# Patient Record
Sex: FEMALE | Race: WHITE | NOT HISPANIC OR LATINO | Employment: FULL TIME | URBAN - METROPOLITAN AREA
[De-identification: names, ages, dates, MRNs, and addresses within clinical notes are randomized per-mention and may not be internally consistent; named-entity substitution may affect disease eponyms.]

---

## 2020-08-14 ENCOUNTER — TELEPHONE (OUTPATIENT)
Dept: FAMILY MEDICINE | Facility: CLINIC | Age: 45
End: 2020-08-14

## 2020-08-14 ENCOUNTER — OFFICE VISIT (OUTPATIENT)
Dept: FAMILY MEDICINE | Facility: CLINIC | Age: 45
End: 2020-08-14
Payer: COMMERCIAL

## 2020-08-14 VITALS
TEMPERATURE: 98 F | OXYGEN SATURATION: 99 % | WEIGHT: 172.81 LBS | DIASTOLIC BLOOD PRESSURE: 78 MMHG | HEIGHT: 63 IN | HEART RATE: 85 BPM | SYSTOLIC BLOOD PRESSURE: 120 MMHG | BODY MASS INDEX: 30.62 KG/M2

## 2020-08-14 DIAGNOSIS — E61.1 IRON DEFICIENCY: ICD-10-CM

## 2020-08-14 DIAGNOSIS — R53.83 FATIGUE, UNSPECIFIED TYPE: ICD-10-CM

## 2020-08-14 DIAGNOSIS — Z11.59 SCREENING FOR VIRAL DISEASE: ICD-10-CM

## 2020-08-14 DIAGNOSIS — R63.5 WEIGHT GAIN: ICD-10-CM

## 2020-08-14 DIAGNOSIS — R22.1 NECK MASS: Primary | ICD-10-CM

## 2020-08-14 DIAGNOSIS — Z85.71 PERSONAL HISTORY OF HODGKIN LYMPHOMA: ICD-10-CM

## 2020-08-14 DIAGNOSIS — R73.03 PREDIABETES: ICD-10-CM

## 2020-08-14 PROCEDURE — 3008F PR BODY MASS INDEX (BMI) DOCUMENTED: ICD-10-PCS | Mod: CPTII,S$GLB,, | Performed by: NURSE PRACTITIONER

## 2020-08-14 PROCEDURE — 99204 OFFICE O/P NEW MOD 45 MIN: CPT | Mod: S$GLB,,, | Performed by: NURSE PRACTITIONER

## 2020-08-14 PROCEDURE — 99204 PR OFFICE/OUTPT VISIT, NEW, LEVL IV, 45-59 MIN: ICD-10-PCS | Mod: S$GLB,,, | Performed by: NURSE PRACTITIONER

## 2020-08-14 PROCEDURE — 3008F BODY MASS INDEX DOCD: CPT | Mod: CPTII,S$GLB,, | Performed by: NURSE PRACTITIONER

## 2020-08-14 NOTE — PROGRESS NOTES
Subjective:       Patient ID: Alyssa Titus is a 44 y.o. female.    Chief Complaint: Establish Care (lymph node thats swollen) and Fatigue    HPI   Here to est primary care. She lives in [place]. She is a . She is . She has a PMH of diabetes, hodgens lymphoma (diagnosed in 2000) and went through chemo--Dr Melida Huerta medical oncologist at [facility] in [place]. She had 3 back surgeries for herniated discs--Dr Girish Albarado [employer] 2001 & Dr Eaton at [facility] 1996. She was diagnosed w/ Covid-19 July 5. She was treated by her GYN provider w/ nebulized desonide, azithromycin, zinc, tessalon.    She has a swollen node on left side of neck since April--sometimes it gets bigger and then it will smaller in a waxing and waning pattern.      Very fatigued for the last 6 mo, including a 15 lb weight gain.      Review of Systems   Constitutional: Positive for fatigue and unexpected weight change (weight gain). Negative for activity change, appetite change, chills and fever.   HENT: Negative for congestion, hearing loss, nosebleeds, postnasal drip, sinus pressure, sore throat and trouble swallowing.    Respiratory: Negative for cough, chest tightness, shortness of breath and wheezing.    Cardiovascular: Negative for chest pain and palpitations.   Gastrointestinal: Negative for abdominal distention, abdominal pain, constipation, diarrhea, nausea and vomiting.   Musculoskeletal: Negative for arthralgias, back pain, joint swelling, neck pain and neck stiffness.   Skin: Negative for color change and pallor.   Neurological: Negative for dizziness, seizures, syncope, weakness, light-headedness and headaches.   Hematological: Positive for adenopathy. Does not bruise/bleed easily.   Psychiatric/Behavioral: Negative for agitation, behavioral problems, confusion and sleep disturbance. The patient is not nervous/anxious.            Past Medical History:  Past Medical History:   Diagnosis Date     Cancer       Past Surgical History:   Procedure Laterality Date     SECTION      SPINE SURGERY        Review of patient's allergies indicates:   Allergen Reactions    Morphine (pf) Itching      No current outpatient medications on file.     No current facility-administered medications for this visit.      Social History     Socioeconomic History    Marital status:      Spouse name: Not on file    Number of children: Not on file    Years of education: Not on file    Highest education level: Not on file   Occupational History    Not on file   Social Needs    Financial resource strain: Not on file    Food insecurity     Worry: Not on file     Inability: Not on file    Transportation needs     Medical: Not on file     Non-medical: Not on file   Tobacco Use    Smoking status: Never Smoker   Substance and Sexual Activity    Alcohol use: Never     Frequency: Never    Drug use: Never    Sexual activity: Yes     Partners: Male     Birth control/protection: Partner-Vasectomy   Lifestyle    Physical activity     Days per week: Not on file     Minutes per session: Not on file    Stress: Not on file   Relationships    Social connections     Talks on phone: Not on file     Gets together: Not on file     Attends Hoahaoism service: Not on file     Active member of club or organization: Not on file     Attends meetings of clubs or organizations: Not on file     Relationship status: Not on file   Other Topics Concern    Not on file   Social History Narrative    Not on file      Family History   Problem Relation Age of Onset    Diabetes Mother     Diabetes Father     Cancer Maternal Aunt     Cancer Maternal Uncle     Cancer Paternal Aunt         Objective:      Physical Exam  Constitutional:       Appearance: She is well-developed.   HENT:      Head: Normocephalic and atraumatic.   Eyes:      General: No scleral icterus.     Conjunctiva/sclera: Conjunctivae normal.      Pupils: Pupils are  equal, round, and reactive to light.   Neck:      Musculoskeletal: Normal range of motion and neck supple.      Thyroid: No thyroid mass.      Vascular: No carotid bruit.      Trachea: Trachea normal.   Cardiovascular:      Rate and Rhythm: Normal rate and regular rhythm.   Pulmonary:      Effort: Pulmonary effort is normal.   Musculoskeletal: Normal range of motion.   Skin:     General: Skin is warm and dry.   Neurological:      Mental Status: She is alert and oriented to person, place, and time.   Psychiatric:         Behavior: Behavior normal.         Judgment: Judgment normal.         Assessment:       1. Neck mass    2. Fatigue, unspecified type    3. Iron deficiency    4. Prediabetes    5. Weight gain    6. Screening for viral disease    7. Personal history of Hodgkin lymphoma        Plan:       PROBLEM LIST     Alyssa was seen today for establish care and fatigue.    Diagnoses and all orders for this visit:    Neck mass  Comments:  left side    Orders:  -     US Soft Tissue Head Neck Thyroid; Future  -     CBC auto differential; Future  -     Comprehensive metabolic panel; Future  -     Lipid Panel; Future  -     Hemoglobin A1C; Future  -     TSH and Free T4; Future  -     Vitamin B12/folate, serum panel; Future  -     Iron and TIBC; Future  -     CBC auto differential  -     Comprehensive metabolic panel  -     Lipid Panel  -     Hemoglobin A1C  -     TSH and Free T4  -     Vitamin B12/folate, serum panel  -     Iron and TIBC    Fatigue, unspecified type  -     US Soft Tissue Head Neck Thyroid; Future  -     CBC auto differential; Future  -     Comprehensive metabolic panel; Future  -     Lipid Panel; Future  -     Hemoglobin A1C; Future  -     TSH and Free T4; Future  -     Vitamin B12/folate, serum panel; Future  -     Iron and TIBC; Future  -     CBC auto differential  -     Comprehensive metabolic panel  -     Lipid Panel  -     Hemoglobin A1C  -     TSH and Free T4  -     Vitamin B12/folate, serum panel  -      Iron and TIBC    Iron deficiency  -     US Soft Tissue Head Neck Thyroid; Future  -     CBC auto differential; Future  -     Comprehensive metabolic panel; Future  -     Lipid Panel; Future  -     Hemoglobin A1C; Future  -     TSH and Free T4; Future  -     Vitamin B12/folate, serum panel; Future  -     Iron and TIBC; Future  -     CBC auto differential  -     Comprehensive metabolic panel  -     Lipid Panel  -     Hemoglobin A1C  -     TSH and Free T4  -     Vitamin B12/folate, serum panel  -     Iron and TIBC    Prediabetes  -     US Soft Tissue Head Neck Thyroid; Future  -     CBC auto differential; Future  -     Comprehensive metabolic panel; Future  -     Lipid Panel; Future  -     Hemoglobin A1C; Future  -     TSH and Free T4; Future  -     Vitamin B12/folate, serum panel; Future  -     Iron and TIBC; Future  -     CBC auto differential  -     Comprehensive metabolic panel  -     Lipid Panel  -     Hemoglobin A1C  -     TSH and Free T4  -     Vitamin B12/folate, serum panel  -     Iron and TIBC    Weight gain  -     US Soft Tissue Head Neck Thyroid; Future  -     CBC auto differential; Future  -     Comprehensive metabolic panel; Future  -     Lipid Panel; Future  -     Hemoglobin A1C; Future  -     TSH and Free T4; Future  -     Vitamin B12/folate, serum panel; Future  -     Iron and TIBC; Future  -     CBC auto differential  -     Comprehensive metabolic panel  -     Lipid Panel  -     Hemoglobin A1C  -     TSH and Free T4  -     Vitamin B12/folate, serum panel  -     Iron and TIBC    Screening for viral disease  -     HIV 1/2 Ag/Ab (4th Gen); Future  -     HIV 1/2 Ag/Ab (4th Gen)    Personal history of Hodgkin lymphoma  -     US Soft Tissue Head Neck Thyroid; Future  -     CBC auto differential; Future  -     Comprehensive metabolic panel; Future  -     Lipid Panel; Future  -     Hemoglobin A1C; Future  -     TSH and Free T4; Future  -     Vitamin B12/folate, serum panel; Future  -     Iron and TIBC;  Future  -     CBC auto differential  -     Comprehensive metabolic panel  -     Lipid Panel  -     Hemoglobin A1C  -     TSH and Free T4  -     Vitamin B12/folate, serum panel  -     Iron and TIBC      Extreme fatigue symptoms and 15 lb weight gain. Will doing baseline fasting labs including thyroid   panel , cbc, b12/folate.     Also prior hx prediabetes/diabetes. She was able to reverse this w/ diet and exercise. Ordering fasting glucose and a1c due to recent weight gain issues.     Vague nodule left side of neck w/i cervical chain-- I believe it is a superficial lymph node she is feeling, but small. Given her lymphoma hx, will order US of neck. RTC in 3 weeks for f/u and we will review labs and imaging at that time.     Will attempt to get previous health records to update screens, vaccines.     I spent 30 minutes with the patient face-to-face, more than 50% was in counseling about patient's condition

## 2020-08-14 NOTE — TELEPHONE ENCOUNTER
----- Message from Hanna Jesus sent at 8/14/2020 11:41 AM CDT -----  #Patient need to speak to nurse regarding scheduling. Call back number [phone] (JEREMIE Dale

## 2020-08-17 LAB
ABS NRBC COUNT: 0 X 10 3/UL (ref 0–0.01)
ABSOLUTE BASOPHIL: 0.04 X 10 3/UL (ref 0–0.22)
ABSOLUTE EOSINOPHIL: 0.1 X 10 3/UL (ref 0.04–0.54)
ABSOLUTE IMMATURE GRAN: 0.05 X 10 3/UL (ref 0–0.04)
ABSOLUTE LYMPHOCYTE: 2.38 X 10 3/UL (ref 0.86–4.75)
ABSOLUTE MONOCYTE: 0.68 X 10 3/UL (ref 0.22–1.08)
ALBUMIN SERPL-MCNC: 4.4 G/DL (ref 3.5–5.2)
ALBUMIN/GLOB SERPL ELPH: 1.5 {RATIO} (ref 1–2.7)
ALP ISOS SERPL LEV INH-CCNC: 62 U/L (ref 35–105)
ALT (SGPT): 44 U/L (ref 0–33)
ANION GAP SERPL CALC-SCNC: 12 MMOL/L (ref 8–17)
AST SERPL-CCNC: 28 U/L (ref 0–32)
B12: 911 PG/ML (ref 232–1245)
BASOPHILS NFR BLD: 0.4 % (ref 0.2–1.2)
BILIRUBIN, TOTAL: 0.4 MG/DL (ref 0–1.2)
BUN/CREAT SERPL: 18.6 (ref 6–20)
CALCIUM SERPL-MCNC: 9.3 MG/DL (ref 8.6–10.2)
CARBON DIOXIDE, CO2: 26 MMOL/L (ref 22–29)
CHLORIDE: 104 MMOL/L (ref 98–107)
CHOLEST SERPL-MSCNC: 182 MG/DL (ref 100–200)
CREAT SERPL-MCNC: 0.59 MG/DL (ref 0.5–0.9)
EOSINOPHIL NFR BLD: 0.9 % (ref 0.7–7)
ESTIMATED AVERAGE GLUCOSE: 137 MG/DL
FOLATE SERPL-MCNC: 29 NG/ML (ref 4.4–31)
GFR ESTIMATION: 109.73
GLOBULIN: 3 G/DL (ref 1.5–4.5)
GLUCOSE: 115 MG/DL (ref 74–106)
HBA1C MFR BLD: 6.4 % (ref 4–6)
HCT VFR BLD AUTO: 40.4 % (ref 37–47)
HDLC SERPL-MCNC: 71 MG/DL
HGB BLD-MCNC: 12.1 G/DL (ref 12–16)
HIV 1+2 AB+HIV1 P24 AG SERPL QL IA: NONREACTIVE
IMMATURE GRANULOCYTES: 0.5 % (ref 0–0.5)
IRON BINDING CAPACITY: 442 UG/DL (ref 262–472)
IRON SERPL-MCNC: 36 UG/DL (ref 37–145)
LDL/HDL RATIO: 1.2 (ref 1–3)
LDLC SERPL CALC-MCNC: 87.4 MG/DL (ref 0–100)
LYMPHOCYTES NFR BLD: 22.2 % (ref 19.3–53.1)
MCH RBC QN AUTO: 22 PG (ref 27–32)
MCHC RBC AUTO-ENTMCNC: 30 G/DL (ref 32–36)
MCV RBC AUTO: 73.5 FL (ref 82–100)
MONOCYTES NFR BLD: 6.3 % (ref 4.7–12.5)
NEUTROPHILS ABSOLUTE COUNT: 7.46 X 10 3/UL (ref 2.15–7.56)
NEUTROPHILS NFR BLD: 69.7 %
NUCLEATED RED BLOOD CELLS: 0 /100 WBC (ref 0–0.2)
PLATELET # BLD AUTO: 328 X 10 3/UL (ref 135–400)
POTASSIUM: 4.2 MMOL/L (ref 3.5–5.1)
PROT SNV-MCNC: 7.4 G/DL (ref 6.4–8.3)
RBC # BLD AUTO: 5.5 X 10 6/UL (ref 4.2–5.4)
RDW-SD: 47.5 FL (ref 37–54)
SODIUM: 142 MMOL/L (ref 136–145)
T4, FREE: 1.09 NG/DL (ref 0.93–1.7)
TRIGL SERPL-MCNC: 118 MG/DL (ref 0–150)
TSH SERPL DL<=0.005 MIU/L-ACNC: 2.96 UIU/ML (ref 0.27–4.2)
UIBC SERPL-MCNC: 406 UG/DL (ref 112–306)
UREA NITROGEN (BUN): 11 MG/DL (ref 6–20)
WBC # BLD: 10.71 X 10 3/UL (ref 4.3–10.8)

## 2020-08-19 ENCOUNTER — OFFICE VISIT (OUTPATIENT)
Dept: FAMILY MEDICINE | Facility: CLINIC | Age: 45
End: 2020-08-19
Payer: COMMERCIAL

## 2020-08-19 VITALS
BODY MASS INDEX: 31.01 KG/M2 | SYSTOLIC BLOOD PRESSURE: 125 MMHG | OXYGEN SATURATION: 97 % | DIASTOLIC BLOOD PRESSURE: 75 MMHG | HEART RATE: 88 BPM | WEIGHT: 175 LBS | TEMPERATURE: 98 F | RESPIRATION RATE: 16 BRPM | HEIGHT: 63 IN

## 2020-08-19 DIAGNOSIS — R59.0 CERVICAL LYMPHADENOPATHY: Primary | ICD-10-CM

## 2020-08-19 DIAGNOSIS — R73.03 PREDIABETES: ICD-10-CM

## 2020-08-19 DIAGNOSIS — Z85.71 PERSONAL HISTORY OF HODGKIN LYMPHOMA: ICD-10-CM

## 2020-08-19 DIAGNOSIS — E55.9 VITAMIN D DEFICIENCY: ICD-10-CM

## 2020-08-19 DIAGNOSIS — E61.1 IRON DEFICIENCY: ICD-10-CM

## 2020-08-19 DIAGNOSIS — R63.5 WEIGHT GAIN: ICD-10-CM

## 2020-08-19 PROCEDURE — 99214 PR OFFICE/OUTPT VISIT, EST, LEVL IV, 30-39 MIN: ICD-10-PCS | Mod: S$GLB,,, | Performed by: NURSE PRACTITIONER

## 2020-08-19 PROCEDURE — 3008F PR BODY MASS INDEX (BMI) DOCUMENTED: ICD-10-PCS | Mod: CPTII,S$GLB,, | Performed by: NURSE PRACTITIONER

## 2020-08-19 PROCEDURE — 3008F BODY MASS INDEX DOCD: CPT | Mod: CPTII,S$GLB,, | Performed by: NURSE PRACTITIONER

## 2020-08-19 PROCEDURE — 99214 OFFICE O/P EST MOD 30 MIN: CPT | Mod: S$GLB,,, | Performed by: NURSE PRACTITIONER

## 2020-08-19 NOTE — PROGRESS NOTES
Subjective:       Patient ID: Alyssa Titus is a 44 y.o. female.    Chief Complaint: Results (Labs and Radiology) and Neck Pain (stiff neck)      She lives in [place]. She is a . She is . She has a PMH of diabetes, hodgkin lymphoma (diagnosed in 2000) and went through chemo--Dr Melida Huerta medical oncologist at [facility] in [place]. She had 3 back surgeries for herniated discs--Dr Girish encisoist 2001 & Dr Eaton at [facility]. She was diagnosed w/ Covid-19 July 5. She was treated by her GYN provider w/ nebulized desonide, azithromycin, zinc, tessalon.     She has a swollen node on left side of neck since April--sometimes it gets bigger and then it will get smaller in a waxing and waning pattern. This is concerning for her given her hx of Hodgkin lymphoma. This was previously diagnosed by a reactive lymph node on right side of neck.       Very fatigued for the last 6 mo, including a 15 lb weight gain.      Hyperlipidemia  Reported by patient.    Type of hyperlipidemia: combined  Duration: chronic  Control: usually well controlled; at goal  Compliance: compliant; compliant with diet; exercises  Complications: no coronary artery disease; no cerebral vascular disease, no peripheral artery disease  ASCVD:The 10-year ASCVD risk score ([place] MARTINEZ Jr., et al., 2013) is: 0.5%    Values used to calculate the score:      Age: 44 years      Sex: Female      Is Non- : No      Diabetic: No      Tobacco smoker: No      Systolic Blood Pressure: 125 mmHg      Is BP treated: No      HDL Cholesterol: 71 mg/dL      Total Cholesterol: 182 mg/dL         Review of Systems   Constitutional: Positive for fatigue and unexpected weight change (weight gain). Negative for activity change, appetite change, chills and fever.   HENT: Negative for congestion, hearing loss, nosebleeds, postnasal drip, sinus pressure, sore throat and trouble swallowing.    Respiratory:  Negative for cough, chest tightness, shortness of breath and wheezing.    Cardiovascular: Negative for chest pain and palpitations.   Gastrointestinal: Negative for abdominal distention, abdominal pain, constipation, diarrhea, nausea and vomiting.   Musculoskeletal: Negative for arthralgias, back pain, joint swelling, neck pain and neck stiffness.   Skin: Negative for color change and pallor.   Neurological: Negative for dizziness, seizures, syncope, weakness, light-headedness and headaches.   Hematological: Positive for adenopathy. Does not bruise/bleed easily.   Psychiatric/Behavioral: Negative for agitation, behavioral problems, confusion and sleep disturbance. The patient is not nervous/anxious.            Past Medical History:  Past Medical History:   Diagnosis Date    Cancer       Past Surgical History:   Procedure Laterality Date     SECTION      SPINE SURGERY        Review of patient's allergies indicates:   Allergen Reactions    Morphine (pf) Itching      No current outpatient medications on file.     No current facility-administered medications for this visit.      Social History     Socioeconomic History    Marital status:      Spouse name: Not on file    Number of children: Not on file    Years of education: Not on file    Highest education level: Not on file   Occupational History    Occupation:    Social Needs    Financial resource strain: Not on file    Food insecurity     Worry: Not on file     Inability: Not on file    Transportation needs     Medical: Not on file     Non-medical: Not on file   Tobacco Use    Smoking status: Never Smoker    Smokeless tobacco: Never Used   Substance and Sexual Activity    Alcohol use: Never     Frequency: Never    Drug use: Never    Sexual activity: Yes     Partners: Male     Birth control/protection: Partner-Vasectomy   Lifestyle    Physical activity     Days per week: Not on file     Minutes per session: Not on file     Stress: Not on file   Relationships    Social connections     Talks on phone: Not on file     Gets together: Not on file     Attends Rastafari service: Not on file     Active member of club or organization: Not on file     Attends meetings of clubs or organizations: Not on file     Relationship status: Not on file   Other Topics Concern    Not on file   Social History Narrative    Not on file      Family History   Problem Relation Age of Onset    Diabetes Mother     Diabetes Father     Cancer Maternal Aunt     Cancer Maternal Uncle     Cancer Paternal Aunt         Objective:      Physical Exam  Constitutional:       Appearance: She is well-developed.   HENT:      Head: Normocephalic and atraumatic.   Eyes:      General: No scleral icterus.     Conjunctiva/sclera: Conjunctivae normal.      Pupils: Pupils are equal, round, and reactive to light.   Neck:      Musculoskeletal: Normal range of motion and neck supple.      Thyroid: No thyroid mass.      Vascular: No carotid bruit.      Trachea: Trachea normal.   Cardiovascular:      Rate and Rhythm: Normal rate and regular rhythm.   Pulmonary:      Effort: Pulmonary effort is normal.   Musculoskeletal: Normal range of motion.   Skin:     General: Skin is warm and dry.   Neurological:      Mental Status: She is alert and oriented to person, place, and time.   Psychiatric:         Behavior: Behavior normal.         Judgment: Judgment normal.         Assessment:       1. Cervical lymphadenopathy    2. Prediabetes    3. Iron deficiency    4. Weight gain    5. Personal history of Hodgkin lymphoma    6. Vitamin D deficiency        Plan:       PROBLEM LIST     Alyssa was seen today for results and neck pain.    Diagnoses and all orders for this visit:    Cervical lymphadenopathy  -     US Biopsy Lymph Nodes (xpd); Future  Reviewed lab results and US results w/ her. Given her hx of Hodgkin lymphoma, will arrange fine-needle aspiration bx of enlarged left cervical lymph  node w/ interventional radiology    Prediabetes  Evidence of prediabetes; discussed diet and exercise at length w/ her. Monitor.      Iron deficiency  Discussed replacement options w/ ferrous sulfate    Weight gain  Diet and exercise as stated above    Personal history of Hodgkin lymphoma  -     US Biopsy Lymph Nodes (xpd); Future    Vitamin D deficiency  -     Vitamin D; Future  -     Vitamin D

## 2020-08-20 LAB — VITAMIN D (25OHD): 34.7 NG/ML

## 2020-08-24 LAB
CALCIUM BLD-MCNC: POSITIVE MG/DL
COV PREGNANCY STATUS: ABNORMAL
COVID-19 AB, IGM: POSITIVE
PATIENT EMPLOYED IN HEALTHCARE: NO
PATIENT HAS SYMPTOMS FOR CONDITION OF INTEREST: NO
PATIENT HOSPITALIZED BC COND: NO
PATIENT IN CONGREGATE CARE: NO

## 2020-12-08 ENCOUNTER — TELEPHONE (OUTPATIENT)
Dept: FAMILY MEDICINE | Facility: CLINIC | Age: 45
End: 2020-12-08
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
## 2020-12-08 NOTE — TELEPHONE ENCOUNTER
Spoke with April with [facility]. Was told that patient just needs to call and reschedule test.    *Patient notified and given the number to reschedule.

## 2020-12-09 ENCOUNTER — OFFICE VISIT (OUTPATIENT)
Dept: FAMILY MEDICINE | Facility: CLINIC | Age: 45
End: 2020-12-09
Payer: COMMERCIAL

## 2020-12-09 VITALS
TEMPERATURE: 98 F | BODY MASS INDEX: 29.84 KG/M2 | RESPIRATION RATE: 18 BRPM | OXYGEN SATURATION: 99 % | HEIGHT: 63 IN | WEIGHT: 168.38 LBS | HEART RATE: 90 BPM | DIASTOLIC BLOOD PRESSURE: 84 MMHG | SYSTOLIC BLOOD PRESSURE: 134 MMHG

## 2020-12-09 DIAGNOSIS — Z85.71 HISTORY OF HODGKIN'S LYMPHOMA: ICD-10-CM

## 2020-12-09 DIAGNOSIS — R73.03 PREDIABETES: Primary | ICD-10-CM

## 2020-12-09 DIAGNOSIS — R63.5 WEIGHT GAIN: ICD-10-CM

## 2020-12-09 DIAGNOSIS — H65.93 FLUID LEVEL BEHIND TYMPANIC MEMBRANE OF BOTH EARS: ICD-10-CM

## 2020-12-09 DIAGNOSIS — D64.9 ANEMIA, UNSPECIFIED TYPE: ICD-10-CM

## 2020-12-09 DIAGNOSIS — R59.0 REACTIVE CERVICAL LYMPHADENOPATHY: ICD-10-CM

## 2020-12-09 LAB
ABS NRBC COUNT: 0 10*3/UL (ref 0–0.2)
ABSOLUTE BASOPHIL: 0.1 10*3/UL (ref 0–0.2)
ABSOLUTE EOSINOPHIL: 0.2 10*3/UL (ref 0–0.4)
ABSOLUTE IMMATURE GRAN: 0.03 (ref 0–0.4)
ABSOLUTE LYMPHOCYTE: 2 10*3/UL (ref 1–4)
ABSOLUTE MONOCYTE: 0.6 10*3/UL (ref 0.1–1)
BASOPHILS NFR BLD: 0.9 % (ref 0–2)
EOSINOPHIL NFR BLD: 2.1 % (ref 0–4)
ESTIMATED AVERAGE GLUCOSE: 145 MG/DL
HBA1C MFR BLD: 6.7 % (ref 4–6)
HCT VFR BLD AUTO: 36.4 % (ref 35–47)
HGB BLD-MCNC: 10.7 G/DL (ref 12–16)
IMMATURE GRANULOCYTES: 0.4 % (ref 0–4.9)
IRON BINDING CAPACITY: 464 UG/DL (ref 262–472)
IRON SERPL-MCNC: 24 UG/DL (ref 37–145)
LYMPHOCYTES NFR BLD: 25.7 % (ref 15–41)
Lab: NORMAL
MCH RBC QN AUTO: 22.3 PG (ref 26–34)
MCHC RBC AUTO-ENTMCNC: 29.4 G/DL (ref 33–37)
MCV RBC AUTO: 76 FL (ref 80–99)
MONOCYTES NFR BLD: 7.9 % (ref 3–10)
NEUTROPHILS # BLD AUTO: 4.9 10*3/UL (ref 2–8)
NEUTROPHILS NFR BLD: 63 % (ref 47–73)
NUCLEATED RED BLOOD CELLS: 0 % (ref 0–2)
PLATELET # BLD AUTO: 436 10*3/UL (ref 150–400)
PMV BLD AUTO: 9.8 FL (ref 7.4–10.4)
RBC # BLD AUTO: 4.8 10*6/UL (ref 3.8–5.2)
RDW-SD: 15.3 % (ref 11.5–14.5)
UIBC SERPL-MCNC: 440 UG/DL (ref 112–306)
WBC # BLD: 7.8 10*3/UL (ref 4.8–10.8)

## 2020-12-09 PROCEDURE — 3008F BODY MASS INDEX DOCD: CPT | Mod: CPTII,S$GLB,, | Performed by: NURSE PRACTITIONER

## 2020-12-09 PROCEDURE — 99213 PR OFFICE/OUTPT VISIT, EST, LEVL III, 20-29 MIN: ICD-10-PCS | Mod: S$GLB,,, | Performed by: NURSE PRACTITIONER

## 2020-12-09 PROCEDURE — 99213 OFFICE O/P EST LOW 20 MIN: CPT | Mod: S$GLB,,, | Performed by: NURSE PRACTITIONER

## 2020-12-09 PROCEDURE — 3008F PR BODY MASS INDEX (BMI) DOCUMENTED: ICD-10-PCS | Mod: CPTII,S$GLB,, | Performed by: NURSE PRACTITIONER

## 2020-12-09 RX ORDER — MONTELUKAST SODIUM 10 MG/1
10 TABLET ORAL NIGHTLY
Qty: 30 TABLET | Refills: 0 | Status: SHIPPED | OUTPATIENT
Start: 2020-12-09 | End: 2021-01-08

## 2020-12-09 RX ORDER — LEVOCETIRIZINE DIHYDROCHLORIDE 5 MG/1
5 TABLET, FILM COATED ORAL NIGHTLY
Qty: 30 TABLET | Refills: 0 | Status: SHIPPED | OUTPATIENT
Start: 2020-12-09 | End: 2021-08-30

## 2020-12-09 RX ORDER — METFORMIN HYDROCHLORIDE 500 MG/1
500 TABLET ORAL
Qty: 90 TABLET | Refills: 3 | Status: SHIPPED | OUTPATIENT
Start: 2020-12-09 | End: 2021-10-25

## 2020-12-09 NOTE — PROGRESS NOTES
Subjective:       Patient ID: Alyssa Titus is a 44 y.o. female.    Chief Complaint: Otalgia (pt reports ear pain, started in left in moved to right ear. Sounds muffled and feels tender. Pt reports this has been going on for about 2 weeks )    HPI     Ear pain x 2 weeks. Started on right side, then moved to the left side. Tender inside and everything sounds muffled. She feels that her left ear may have drained briefly.     She reports hx of PCOD and prediabetes. She states that she has gained 30 lb this year and having a hard time losing weight. In Aug 2020, her A1c was 6.4% w/ mean glucose 137. Her TSH was 2.9 w/ free T4 1.09. She is not on medication for prediabetes.      She has a PMH of diabetes, hodgkin lymphoma (diagnosed in 2000) and went through chemo--Dr Melida Huerta medical oncologist at [facility] in [place].   She has a swollen node on left side of neck since April--sometimes it gets bigger and then it will get smaller in a waxing and waning pattern. This is concerning for her given her hx of Hodgkin lymphoma. This was previously diagnosed by a reactive lymph node on right side of neck. Repeat US in Aug 2020 suggest reactive > 1cm lymphnode Lt cervical region. She is scheduled for biopsy w/ interventional radiology in upcoming weeks.     Review of Systems   Constitutional: Positive for unexpected weight change. Negative for activity change, appetite change, chills and fever.   HENT: Negative for congestion, hearing loss, nosebleeds, postnasal drip, sinus pressure, sore throat and trouble swallowing.    Respiratory: Negative for cough, chest tightness, shortness of breath and wheezing.    Cardiovascular: Negative for chest pain and palpitations.   Gastrointestinal: Negative for abdominal distention, abdominal pain, constipation, diarrhea, nausea and vomiting.   Genitourinary: Negative for difficulty urinating, flank pain, frequency, hematuria and urgency.   Musculoskeletal: Negative for arthralgias,  back pain, joint swelling, neck pain and neck stiffness.   Skin: Negative for color change and pallor.   Neurological: Negative for dizziness, seizures, syncope, weakness, light-headedness and headaches.   Hematological: Negative for adenopathy. Does not bruise/bleed easily.   Psychiatric/Behavioral: Negative for agitation, behavioral problems, confusion and sleep disturbance. The patient is not nervous/anxious.            Past Medical History:  Past Medical History:   Diagnosis Date    Cancer       Past Surgical History:   Procedure Laterality Date     SECTION      SPINE SURGERY        Review of patient's allergies indicates:   Allergen Reactions    Morphine (pf) Itching      Current Outpatient Medications   Medication Sig Dispense Refill    levocetirizine (XYZAL) 5 MG tablet Take 1 tablet (5 mg total) by mouth every evening. 30 tablet 0    metFORMIN (GLUCOPHAGE) 500 MG tablet Take 1 tablet (500 mg total) by mouth daily with breakfast. 90 tablet 3    montelukast (SINGULAIR) 10 mg tablet Take 1 tablet (10 mg total) by mouth every evening. 30 tablet 0     No current facility-administered medications for this visit.      Social History     Socioeconomic History    Marital status:      Spouse name: Not on file    Number of children: Not on file    Years of education: Not on file    Highest education level: Not on file   Occupational History    Occupation:    Social Needs    Financial resource strain: Not on file    Food insecurity     Worry: Not on file     Inability: Not on file    Transportation needs     Medical: Not on file     Non-medical: Not on file   Tobacco Use    Smoking status: Never Smoker    Smokeless tobacco: Never Used   Substance and Sexual Activity    Alcohol use: Never     Frequency: Never    Drug use: Never    Sexual activity: Yes     Partners: Male     Birth control/protection: Partner-Vasectomy   Lifestyle    Physical activity     Days per week:  Not on file     Minutes per session: Not on file    Stress: Not on file   Relationships    Social connections     Talks on phone: Not on file     Gets together: Not on file     Attends Religion service: Not on file     Active member of club or organization: Not on file     Attends meetings of clubs or organizations: Not on file     Relationship status: Not on file   Other Topics Concern    Not on file   Social History Narrative    Not on file      Family History   Problem Relation Age of Onset    Diabetes Mother     Diabetes Father     Cancer Maternal Aunt     Cancer Maternal Uncle     Cancer Paternal Aunt         Objective:      Physical Exam  Constitutional:       Appearance: She is well-developed.   HENT:      Head: Normocephalic and atraumatic.   Eyes:      General: No scleral icterus.     Conjunctiva/sclera: Conjunctivae normal.      Pupils: Pupils are equal, round, and reactive to light.   Neck:      Musculoskeletal: Normal range of motion and neck supple.      Thyroid: No thyroid mass.      Vascular: No carotid bruit.      Trachea: Trachea normal.   Cardiovascular:      Rate and Rhythm: Normal rate and regular rhythm.      Heart sounds: Normal heart sounds.   Pulmonary:      Effort: Pulmonary effort is normal.      Breath sounds: Normal breath sounds.   Abdominal:      General: Bowel sounds are normal.      Palpations: Abdomen is soft.   Musculoskeletal: Normal range of motion.   Skin:     General: Skin is warm and dry.   Neurological:      Mental Status: She is alert and oriented to person, place, and time.   Psychiatric:         Behavior: Behavior normal.         Judgment: Judgment normal.         Assessment:       1. Prediabetes    2. Fluid level behind tympanic membrane of both ears    3. Anemia, unspecified type    4. Weight gain    5. Reactive cervical lymphadenopathy    6. History of Hodgkin's lymphoma        Plan:       PROBLEM LIST     Alyssa was seen today for otalgia.    Diagnoses and all  orders for this visit:    Prediabetes  -     metFORMIN (GLUCOPHAGE) 500 MG tablet; Take 1 tablet (500 mg total) by mouth daily with breakfast.  -     Hemoglobin A1C; Future  -     Hemoglobin A1C  Will restart on conservative dose of metformin    Fluid level behind tympanic membrane of both ears  -     montelukast (SINGULAIR) 10 mg tablet; Take 1 tablet (10 mg total) by mouth every evening.  -     levocetirizine (XYZAL) 5 MG tablet; Take 1 tablet (5 mg total) by mouth every evening.  Recommend 2nd gen antihistamine w/ auto- insufflation    Anemia, unspecified type  -     CBC Auto Differential; Future  -     Iron and TIBC; Future  -     CBC Auto Differential  -     Iron and TIBC    Weight gain    Reactive cervical lymphadenopathy  bx scheduled w/ IR    History of Hodgkin's lymphoma

## 2020-12-15 DIAGNOSIS — E04.1 THYROID NODULE INCIDENTALLY NOTED ON IMAGING STUDY: Primary | ICD-10-CM

## 2020-12-17 LAB — SPECIMEN TO PATHOLOGY: NORMAL

## 2020-12-18 NOTE — PROGRESS NOTES
Contacted her regarding neg lymph node bx. She mentioned incidental finding of large thyroid nodule on Lt side.     CBC results pending--will contact [facility]. Iron stores low. A1c from 6.4% to 6.7%. Started on metformin 12/9/20

## 2020-12-28 ENCOUNTER — PATIENT MESSAGE (OUTPATIENT)
Dept: FAMILY MEDICINE | Facility: CLINIC | Age: 45
End: 2020-12-28

## 2021-01-04 ENCOUNTER — TELEPHONE (OUTPATIENT)
Dept: FAMILY MEDICINE | Facility: CLINIC | Age: 46
End: 2021-01-04

## 2021-04-26 ENCOUNTER — PATIENT OUTREACH (OUTPATIENT)
Dept: ADMINISTRATIVE | Facility: HOSPITAL | Age: 46
End: 2021-04-26

## 2021-06-25 ENCOUNTER — PATIENT MESSAGE (OUTPATIENT)
Dept: FAMILY MEDICINE | Facility: CLINIC | Age: 46
End: 2021-06-25

## 2021-08-30 ENCOUNTER — OFFICE VISIT (OUTPATIENT)
Dept: OBSTETRICS AND GYNECOLOGY | Facility: CLINIC | Age: 46
End: 2021-08-30
Payer: COMMERCIAL

## 2021-08-30 VITALS
SYSTOLIC BLOOD PRESSURE: 150 MMHG | HEIGHT: 63 IN | DIASTOLIC BLOOD PRESSURE: 90 MMHG | BODY MASS INDEX: 32.64 KG/M2 | HEART RATE: 93 BPM | WEIGHT: 184.19 LBS

## 2021-08-30 DIAGNOSIS — Z01.419 ENCOUNTER FOR ANNUAL ROUTINE GYNECOLOGICAL EXAMINATION: Primary | ICD-10-CM

## 2021-08-30 DIAGNOSIS — Z86.16 PERSONAL HISTORY OF COVID-19: ICD-10-CM

## 2021-08-30 DIAGNOSIS — R53.82 CHRONIC FATIGUE: ICD-10-CM

## 2021-08-30 DIAGNOSIS — R53.83 FATIGUE, UNSPECIFIED TYPE: ICD-10-CM

## 2021-08-30 DIAGNOSIS — E88.810 METABOLIC SYNDROME: ICD-10-CM

## 2021-08-30 DIAGNOSIS — E04.2 MULTIPLE THYROID NODULES: ICD-10-CM

## 2021-08-30 LAB
ABS NRBC COUNT: 0 X 10 3/UL (ref 0–0.01)
ABSOLUTE BASOPHIL: 0.04 X 10 3/UL (ref 0–0.22)
ABSOLUTE EOSINOPHIL: 0.06 X 10 3/UL (ref 0.04–0.54)
ABSOLUTE IMMATURE GRAN: 0.02 X 10 3/UL (ref 0–0.04)
ABSOLUTE LYMPHOCYTE: 1.74 X 10 3/UL (ref 0.86–4.75)
ABSOLUTE MONOCYTE: 0.47 X 10 3/UL (ref 0.22–1.08)
ALBUMIN SERPL-MCNC: 4.8 G/DL (ref 3.5–5.2)
ALBUMIN/GLOB SERPL ELPH: 1.7 {RATIO} (ref 1–2.7)
ALP ISOS SERPL LEV INH-CCNC: 52 U/L (ref 35–105)
ALT (SGPT): 21 U/L (ref 0–33)
AMORPH URATE CRY URNS QL MICRO: NEGATIVE
ANION GAP SERPL CALC-SCNC: 13 MMOL/L (ref 8–17)
AST SERPL-CCNC: 16 U/L (ref 0–32)
BACTERIA #/AREA URNS HPF: ABNORMAL /[HPF]
BASOPHILS NFR BLD: 0.5 % (ref 0.2–1.2)
BILIRUB UR QL STRIP: NEGATIVE
BILIRUBIN, TOTAL: 0.39 MG/DL (ref 0–1.2)
BUN/CREAT SERPL: 15.5 (ref 6–20)
CALCIUM SERPL-MCNC: 10.2 MG/DL (ref 8.6–10.2)
CARBON DIOXIDE, CO2: 26 MMOL/L (ref 22–29)
CHLORIDE: 99 MMOL/L (ref 98–107)
CHOLEST SERPL-MSCNC: 183 MG/DL (ref 100–200)
CLARITY UR: CLEAR
COLOR UR: YELLOW
COVID-19 (SARS-COV-2) TOTAL ANTIBODIES: REACTIVE
CREAT SERPL-MCNC: 0.58 MG/DL (ref 0.5–0.9)
EOSINOPHIL NFR BLD: 0.7 % (ref 0.7–7)
EPITHELIAL CELLS: ABNORMAL
ESTIMATED AVERAGE GLUCOSE: 134 MG/DL
GFR ESTIMATION: 111.43
GLOBULIN: 2.9 G/DL (ref 1.5–4.5)
GLUCOSE (UA): NEGATIVE MG/DL
GLUCOSE: 108 MG/DL (ref 74–106)
HBA1C MFR BLD: 6.3 % (ref 4–6)
HCT VFR BLD AUTO: 43.3 % (ref 37–47)
HDLC SERPL-MCNC: 70 MG/DL
HGB BLD-MCNC: 14.4 G/DL (ref 12–16)
IMMATURE GRANULOCYTES: 0.2 % (ref 0–0.5)
KETONES UR QL STRIP: NEGATIVE MG/DL
LDL/HDL RATIO: 1.2 (ref 1–3)
LDLC SERPL CALC-MCNC: 85.2 MG/DL (ref 0–100)
LEUKOCYTE ESTERASE UR QL STRIP: NEGATIVE
LYMPHOCYTES NFR BLD: 21 % (ref 19.3–53.1)
MCH RBC QN AUTO: 27.3 PG (ref 27–32)
MCHC RBC AUTO-ENTMCNC: 33.3 G/DL (ref 32–36)
MCV RBC AUTO: 82 FL (ref 82–100)
MONOCYTES NFR BLD: 5.7 % (ref 4.7–12.5)
MUCOUS THREADS URNS QL MICRO: ABNORMAL
NEUTROPHILS # BLD AUTO: 5.95 X 10 3/UL (ref 2.15–7.56)
NEUTROPHILS NFR BLD: 71.9 %
NITRITE UR QL STRIP: NEGATIVE
NUCLEATED RED BLOOD CELLS: 0 /100 WBC (ref 0–0.2)
OCCULT BLOOD: ABNORMAL
PH, URINE: 5 (ref 5–7.5)
PLATELET # BLD AUTO: 335 X 10 3/UL (ref 135–400)
POTASSIUM: 4.4 MMOL/L (ref 3.5–5.1)
PROT SNV-MCNC: 7.7 G/DL (ref 6.4–8.3)
PROT UR QL STRIP: NEGATIVE MG/DL
RBC # BLD AUTO: 5.28 X 10 6/UL (ref 4.2–5.4)
RBC/HPF: ABNORMAL
RDW-SD: 42.8 FL (ref 37–54)
SODIUM: 138 MMOL/L (ref 136–145)
SP GR UR STRIP: 1.02 (ref 1–1.03)
TRIGL SERPL-MCNC: 139 MG/DL (ref 0–150)
UREA NITROGEN (BUN): 9 MG/DL (ref 6–20)
UROBILINOGEN, URINE: NORMAL E.U./DL (ref 0–1)
VITAMIN D (25OHD): 29.2 NG/ML
WBC # BLD: 8.28 X 10 3/UL (ref 4.3–10.8)
WBC/HPF: ABNORMAL

## 2021-08-30 PROCEDURE — 3080F PR MOST RECENT DIASTOLIC BLOOD PRESSURE >= 90 MM HG: ICD-10-PCS | Mod: CPTII,S$GLB,, | Performed by: NURSE PRACTITIONER

## 2021-08-30 PROCEDURE — 3080F DIAST BP >= 90 MM HG: CPT | Mod: CPTII,S$GLB,, | Performed by: NURSE PRACTITIONER

## 2021-08-30 PROCEDURE — 3008F BODY MASS INDEX DOCD: CPT | Mod: CPTII,S$GLB,, | Performed by: NURSE PRACTITIONER

## 2021-08-30 PROCEDURE — 1159F MED LIST DOCD IN RCRD: CPT | Mod: CPTII,S$GLB,, | Performed by: NURSE PRACTITIONER

## 2021-08-30 PROCEDURE — 99396 PR PREVENTIVE VISIT,EST,40-64: ICD-10-PCS | Mod: S$GLB,,, | Performed by: NURSE PRACTITIONER

## 2021-08-30 PROCEDURE — 3077F SYST BP >= 140 MM HG: CPT | Mod: CPTII,S$GLB,, | Performed by: NURSE PRACTITIONER

## 2021-08-30 PROCEDURE — 1160F PR REVIEW ALL MEDS BY PRESCRIBER/CLIN PHARMACIST DOCUMENTED: ICD-10-PCS | Mod: CPTII,S$GLB,, | Performed by: NURSE PRACTITIONER

## 2021-08-30 PROCEDURE — 1159F PR MEDICATION LIST DOCUMENTED IN MEDICAL RECORD: ICD-10-PCS | Mod: CPTII,S$GLB,, | Performed by: NURSE PRACTITIONER

## 2021-08-30 PROCEDURE — 3077F PR MOST RECENT SYSTOLIC BLOOD PRESSURE >= 140 MM HG: ICD-10-PCS | Mod: CPTII,S$GLB,, | Performed by: NURSE PRACTITIONER

## 2021-08-30 PROCEDURE — 3008F PR BODY MASS INDEX (BMI) DOCUMENTED: ICD-10-PCS | Mod: CPTII,S$GLB,, | Performed by: NURSE PRACTITIONER

## 2021-08-30 PROCEDURE — 99396 PREV VISIT EST AGE 40-64: CPT | Mod: S$GLB,,, | Performed by: NURSE PRACTITIONER

## 2021-08-30 PROCEDURE — 1160F RVW MEDS BY RX/DR IN RCRD: CPT | Mod: CPTII,S$GLB,, | Performed by: NURSE PRACTITIONER

## 2021-08-30 RX ORDER — IBUPROFEN 600 MG/1
600 TABLET ORAL EVERY 6 HOURS
COMMUNITY
Start: 2021-04-05 | End: 2023-08-31

## 2021-08-30 RX ORDER — LEVONORGESTREL AND ETHINYL ESTRADIOL 100-20(84)
KIT ORAL
COMMUNITY
Start: 2021-05-22 | End: 2021-11-30

## 2021-09-01 LAB
ANA SER-ACNC: NEGATIVE
MONO TEST: NEGATIVE

## 2021-10-25 ENCOUNTER — PATIENT MESSAGE (OUTPATIENT)
Dept: FAMILY MEDICINE | Facility: CLINIC | Age: 46
End: 2021-10-25
Payer: COMMERCIAL

## 2021-10-25 DIAGNOSIS — R73.03 PREDIABETES: Primary | ICD-10-CM

## 2021-10-25 RX ORDER — METFORMIN HYDROCHLORIDE 850 MG/1
850 TABLET ORAL 2 TIMES DAILY WITH MEALS
Qty: 180 TABLET | Refills: 3 | Status: SHIPPED | OUTPATIENT
Start: 2021-10-25 | End: 2022-11-21 | Stop reason: SDUPTHER

## 2021-11-30 ENCOUNTER — OFFICE VISIT (OUTPATIENT)
Dept: OBSTETRICS AND GYNECOLOGY | Facility: CLINIC | Age: 46
End: 2021-11-30
Payer: COMMERCIAL

## 2021-11-30 VITALS
HEART RATE: 92 BPM | WEIGHT: 190.81 LBS | BODY MASS INDEX: 33.81 KG/M2 | DIASTOLIC BLOOD PRESSURE: 90 MMHG | HEIGHT: 63 IN | SYSTOLIC BLOOD PRESSURE: 149 MMHG

## 2021-11-30 DIAGNOSIS — R53.83 FATIGUE, UNSPECIFIED TYPE: ICD-10-CM

## 2021-11-30 DIAGNOSIS — N92.1 MENORRHAGIA WITH IRREGULAR CYCLE: Primary | ICD-10-CM

## 2021-11-30 DIAGNOSIS — E88.810 METABOLIC SYNDROME: ICD-10-CM

## 2021-11-30 DIAGNOSIS — N94.6 DYSMENORRHEA: ICD-10-CM

## 2021-11-30 DIAGNOSIS — R03.0 ELEVATED BP WITHOUT DIAGNOSIS OF HYPERTENSION: ICD-10-CM

## 2021-11-30 PROCEDURE — 99213 PR OFFICE/OUTPT VISIT, EST, LEVL III, 20-29 MIN: ICD-10-PCS | Mod: S$GLB,,, | Performed by: NURSE PRACTITIONER

## 2021-11-30 PROCEDURE — 99213 OFFICE O/P EST LOW 20 MIN: CPT | Mod: S$GLB,,, | Performed by: NURSE PRACTITIONER

## 2021-11-30 RX ORDER — NORETHINDRONE ACETATE AND ETHINYL ESTRADIOL .02; 1 MG/1; MG/1
1 TABLET ORAL DAILY
Qty: 30 TABLET | Refills: 11 | Status: SHIPPED | OUTPATIENT
Start: 2021-11-30 | End: 2021-11-30

## 2021-11-30 RX ORDER — HYDROCHLOROTHIAZIDE 25 MG/1
25 TABLET ORAL DAILY
Qty: 30 TABLET | Refills: 11 | Status: SHIPPED | OUTPATIENT
Start: 2021-11-30 | End: 2022-12-21 | Stop reason: SDUPTHER

## 2022-02-21 ENCOUNTER — OFFICE VISIT (OUTPATIENT)
Dept: FAMILY MEDICINE | Facility: CLINIC | Age: 47
End: 2022-02-21
Payer: COMMERCIAL

## 2022-02-21 VITALS
HEIGHT: 63 IN | WEIGHT: 188 LBS | HEART RATE: 103 BPM | TEMPERATURE: 98 F | SYSTOLIC BLOOD PRESSURE: 128 MMHG | BODY MASS INDEX: 33.31 KG/M2 | DIASTOLIC BLOOD PRESSURE: 84 MMHG | RESPIRATION RATE: 18 BRPM | OXYGEN SATURATION: 97 %

## 2022-02-21 DIAGNOSIS — M65.311 TRIGGER FINGER OF RIGHT THUMB: Primary | ICD-10-CM

## 2022-02-21 PROCEDURE — 3079F PR MOST RECENT DIASTOLIC BLOOD PRESSURE 80-89 MM HG: ICD-10-PCS | Mod: CPTII,S$GLB,, | Performed by: NURSE PRACTITIONER

## 2022-02-21 PROCEDURE — 3008F BODY MASS INDEX DOCD: CPT | Mod: CPTII,S$GLB,, | Performed by: NURSE PRACTITIONER

## 2022-02-21 PROCEDURE — 3074F PR MOST RECENT SYSTOLIC BLOOD PRESSURE < 130 MM HG: ICD-10-PCS | Mod: CPTII,S$GLB,, | Performed by: NURSE PRACTITIONER

## 2022-02-21 PROCEDURE — 99213 PR OFFICE/OUTPT VISIT, EST, LEVL III, 20-29 MIN: ICD-10-PCS | Mod: S$GLB,,, | Performed by: NURSE PRACTITIONER

## 2022-02-21 PROCEDURE — 3008F PR BODY MASS INDEX (BMI) DOCUMENTED: ICD-10-PCS | Mod: CPTII,S$GLB,, | Performed by: NURSE PRACTITIONER

## 2022-02-21 PROCEDURE — 3079F DIAST BP 80-89 MM HG: CPT | Mod: CPTII,S$GLB,, | Performed by: NURSE PRACTITIONER

## 2022-02-21 PROCEDURE — 1159F PR MEDICATION LIST DOCUMENTED IN MEDICAL RECORD: ICD-10-PCS | Mod: CPTII,S$GLB,, | Performed by: NURSE PRACTITIONER

## 2022-02-21 PROCEDURE — 1159F MED LIST DOCD IN RCRD: CPT | Mod: CPTII,S$GLB,, | Performed by: NURSE PRACTITIONER

## 2022-02-21 PROCEDURE — 99213 OFFICE O/P EST LOW 20 MIN: CPT | Mod: S$GLB,,, | Performed by: NURSE PRACTITIONER

## 2022-02-21 PROCEDURE — 3074F SYST BP LT 130 MM HG: CPT | Mod: CPTII,S$GLB,, | Performed by: NURSE PRACTITIONER

## 2022-02-21 RX ORDER — DICLOFENAC SODIUM 10 MG/G
2 GEL TOPICAL 4 TIMES DAILY
Qty: 100 G | Refills: 3 | Status: SHIPPED | OUTPATIENT
Start: 2022-02-21 | End: 2022-04-20

## 2022-02-21 RX ORDER — METHYLPREDNISOLONE 4 MG/1
TABLET ORAL
Qty: 21 TABLET | Refills: 0 | Status: SHIPPED | OUTPATIENT
Start: 2022-02-21 | End: 2022-04-20

## 2022-02-21 RX ORDER — ALBUTEROL SULFATE 90 UG/1
AEROSOL, METERED RESPIRATORY (INHALATION)
COMMUNITY
Start: 2022-02-09 | End: 2022-04-20

## 2022-02-21 NOTE — PROGRESS NOTES
Subjective:       Patient ID: Alyssa Titus is a 46 y.o. female.    Chief Complaint: fingers locking right hand    HPI     Problems with stiffness of joints of her fingers on her right hand. She reports thumb is 'locking up' and is very painful. She reports this has been happening for approx 1 month now.     Review of Systems   Constitutional: Negative for activity change and unexpected weight change.   HENT: Negative for hearing loss, rhinorrhea and trouble swallowing.    Eyes: Negative for discharge and visual disturbance.   Respiratory: Negative for chest tightness and wheezing.    Cardiovascular: Negative for chest pain and palpitations.   Gastrointestinal: Negative for blood in stool, constipation, diarrhea and vomiting.   Endocrine: Negative for polydipsia and polyuria.   Genitourinary: Negative for difficulty urinating, dysuria, hematuria and menstrual problem.   Musculoskeletal: Positive for arthralgias and joint swelling. Negative for neck pain.   Neurological: Negative for weakness and headaches.   Psychiatric/Behavioral: Negative for confusion and dysphoric mood.           Past Medical History:  Past Medical History:   Diagnosis Date    Cancer       Past Surgical History:   Procedure Laterality Date     SECTION      DILATION AND CURETTAGE OF UTERUS      SPINE SURGERY        Review of patient's allergies indicates:   Allergen Reactions    Morphine Itching, Rash and Shortness Of Breath    Morphine (pf) Itching    Propoxyphene n-acetaminophen Itching and Rash      Current Outpatient Medications   Medication Sig Dispense Refill    hydroCHLOROthiazide (HYDRODIURIL) 25 MG tablet Take 1 tablet (25 mg total) by mouth once daily. 30 tablet 11    ibuprofen (ADVIL,MOTRIN) 600 MG tablet Take 600 mg by mouth every 6 (six) hours.      metFORMIN (GLUCOPHAGE) 850 MG tablet Take 1 tablet (850 mg total) by mouth 2 (two) times daily with meals. 180 tablet 3    norethindrone-ethinyl estradiol  (MICROGESTIN 1/20) 1-20 mg-mcg per tablet TAKE 1 TABLET BY MOUTH ONCE DAILY 21 tablet 11    albuterol (PROVENTIL/VENTOLIN HFA) 90 mcg/actuation inhaler inhale 2 puffs BY MOUTH EVERY 4 HOURS AS NEEDED FOR cough      diclofenac sodium (VOLTAREN) 1 % Gel Apply 2 g topically 4 (four) times daily. 100 g 3    methylPREDNISolone (MEDROL DOSEPACK) 4 mg tablet use as directed 21 tablet 0     No current facility-administered medications for this visit.     Social History     Socioeconomic History    Marital status:    Occupational History    Occupation: Graphic Designer   Tobacco Use    Smoking status: Never Smoker    Smokeless tobacco: Never Used   Substance and Sexual Activity    Alcohol use: Never    Drug use: Never    Sexual activity: Yes     Partners: Male     Birth control/protection: Partner-Vasectomy      Family History   Problem Relation Age of Onset    Diabetes Mother     Diabetes Father     Cancer Maternal Aunt     Cancer Maternal Uncle     Cancer Paternal Aunt         Objective:      Physical Exam  Constitutional:       Appearance: Normal appearance.   Pulmonary:      Effort: Pulmonary effort is normal.   Musculoskeletal:         General: Tenderness (trigger finger 1st digit right hand) present. No swelling or signs of injury.   Skin:     General: Skin is warm and dry.   Neurological:      Mental Status: She is alert and oriented to person, place, and time.   Psychiatric:         Mood and Affect: Mood normal.         Behavior: Behavior normal.         Assessment:     1. Trigger finger of right thumb      Plan:       PROBLEM LIST     Trigger finger of right thumb  -     methylPREDNISolone (MEDROL DOSEPACK) 4 mg tablet; use as directed  Dispense: 21 tablet; Refill: 0  -     diclofenac sodium (VOLTAREN) 1 % Gel; Apply 2 g topically 4 (four) times daily.  Dispense: 100 g; Refill: 3

## 2022-03-22 ENCOUNTER — PATIENT MESSAGE (OUTPATIENT)
Dept: ADMINISTRATIVE | Facility: HOSPITAL | Age: 47
End: 2022-03-22
Payer: COMMERCIAL

## 2022-04-20 ENCOUNTER — PATIENT MESSAGE (OUTPATIENT)
Dept: FAMILY MEDICINE | Facility: CLINIC | Age: 47
End: 2022-04-20

## 2022-04-20 ENCOUNTER — OFFICE VISIT (OUTPATIENT)
Dept: FAMILY MEDICINE | Facility: CLINIC | Age: 47
End: 2022-04-20
Payer: COMMERCIAL

## 2022-04-20 VITALS
WEIGHT: 189 LBS | OXYGEN SATURATION: 98 % | HEIGHT: 63 IN | HEART RATE: 94 BPM | BODY MASS INDEX: 33.49 KG/M2 | DIASTOLIC BLOOD PRESSURE: 92 MMHG | SYSTOLIC BLOOD PRESSURE: 139 MMHG | RESPIRATION RATE: 16 BRPM | TEMPERATURE: 99 F

## 2022-04-20 DIAGNOSIS — E88.819 INSULIN RESISTANCE: Primary | Chronic | ICD-10-CM

## 2022-04-20 DIAGNOSIS — E88.9 METABOLIC DISORDER: Chronic | ICD-10-CM

## 2022-04-20 DIAGNOSIS — M79.642 BILATERAL HAND PAIN: Chronic | ICD-10-CM

## 2022-04-20 DIAGNOSIS — M79.641 BILATERAL HAND PAIN: Chronic | ICD-10-CM

## 2022-04-20 DIAGNOSIS — E66.09 CLASS 1 OBESITY DUE TO EXCESS CALORIES WITH SERIOUS COMORBIDITY AND BODY MASS INDEX (BMI) OF 33.0 TO 33.9 IN ADULT: ICD-10-CM

## 2022-04-20 DIAGNOSIS — R73.03 PREDIABETES: Chronic | ICD-10-CM

## 2022-04-20 PROCEDURE — 99214 OFFICE O/P EST MOD 30 MIN: CPT | Mod: S$GLB,,, | Performed by: NURSE PRACTITIONER

## 2022-04-20 PROCEDURE — 3080F DIAST BP >= 90 MM HG: CPT | Mod: CPTII,S$GLB,, | Performed by: NURSE PRACTITIONER

## 2022-04-20 PROCEDURE — 1160F RVW MEDS BY RX/DR IN RCRD: CPT | Mod: CPTII,S$GLB,, | Performed by: NURSE PRACTITIONER

## 2022-04-20 PROCEDURE — 3075F PR MOST RECENT SYSTOLIC BLOOD PRESS GE 130-139MM HG: ICD-10-PCS | Mod: CPTII,S$GLB,, | Performed by: NURSE PRACTITIONER

## 2022-04-20 PROCEDURE — 1160F PR REVIEW ALL MEDS BY PRESCRIBER/CLIN PHARMACIST DOCUMENTED: ICD-10-PCS | Mod: CPTII,S$GLB,, | Performed by: NURSE PRACTITIONER

## 2022-04-20 PROCEDURE — 3008F BODY MASS INDEX DOCD: CPT | Mod: CPTII,S$GLB,, | Performed by: NURSE PRACTITIONER

## 2022-04-20 PROCEDURE — 1159F PR MEDICATION LIST DOCUMENTED IN MEDICAL RECORD: ICD-10-PCS | Mod: CPTII,S$GLB,, | Performed by: NURSE PRACTITIONER

## 2022-04-20 PROCEDURE — 99214 PR OFFICE/OUTPT VISIT, EST, LEVL IV, 30-39 MIN: ICD-10-PCS | Mod: S$GLB,,, | Performed by: NURSE PRACTITIONER

## 2022-04-20 PROCEDURE — 3008F PR BODY MASS INDEX (BMI) DOCUMENTED: ICD-10-PCS | Mod: CPTII,S$GLB,, | Performed by: NURSE PRACTITIONER

## 2022-04-20 PROCEDURE — 1159F MED LIST DOCD IN RCRD: CPT | Mod: CPTII,S$GLB,, | Performed by: NURSE PRACTITIONER

## 2022-04-20 PROCEDURE — 3080F PR MOST RECENT DIASTOLIC BLOOD PRESSURE >= 90 MM HG: ICD-10-PCS | Mod: CPTII,S$GLB,, | Performed by: NURSE PRACTITIONER

## 2022-04-20 PROCEDURE — 3075F SYST BP GE 130 - 139MM HG: CPT | Mod: CPTII,S$GLB,, | Performed by: NURSE PRACTITIONER

## 2022-04-20 RX ORDER — SEMAGLUTIDE 1.34 MG/ML
1 INJECTION, SOLUTION SUBCUTANEOUS
Qty: 1 PEN | Refills: 11 | Status: SHIPPED | OUTPATIENT
Start: 2022-04-20 | End: 2022-12-21 | Stop reason: SDUPTHER

## 2022-04-20 NOTE — PROGRESS NOTES
"Subjective:       Patient ID: Alyssa Titus is a 46 y.o. female.    Chief Complaint: Follow-up (Pt complains of trouble losing weight, and is still having some pain in her fingers on her left hand. Pt also complains of her blood sugar being high )    HPI   Trouble losing weight; blood sugar trending higher at home. "I don't believe metformin is helping at all." Her A1c in Dec 2020 was 6.7%, then 6.3% Aug 2021. Today her A1c is 6.4%. She has attempted low carb diets,  keto diets, and calorie restriction-- but she is having trouble managing elevated glucose levels. She reports she cannot lose weight. Current BMI 33.    Problems with pain and stiffness both hands for the last 4 months.  She reports Rt and Lt thumbs are 'locking up' and are very painful. She is also having pain jts middle finger Lt hand. Attempted systemic steroid briefly and diclofenac gel--but pain and locking remains.       Review of Systems   Constitutional: Negative for activity change and unexpected weight change.   HENT: Negative for hearing loss, rhinorrhea and trouble swallowing.    Eyes: Negative for discharge and visual disturbance.   Respiratory: Negative for chest tightness and wheezing.    Cardiovascular: Negative for chest pain and palpitations.   Gastrointestinal: Negative for blood in stool, constipation, diarrhea and vomiting.   Endocrine: Negative for polydipsia and polyuria.   Genitourinary: Negative for difficulty urinating, dysuria, hematuria and menstrual problem.   Musculoskeletal: Positive for arthralgias and joint swelling. Negative for neck pain.   Neurological: Negative for weakness and headaches.   Psychiatric/Behavioral: Negative for confusion and dysphoric mood.           Past Medical History:  Past Medical History:   Diagnosis Date    Cancer       Past Surgical History:   Procedure Laterality Date     SECTION      DILATION AND CURETTAGE OF UTERUS      SPINE SURGERY        Review of patient's allergies " indicates:   Allergen Reactions    Morphine Itching, Rash and Shortness Of Breath    Morphine (pf) Itching    Propoxyphene n-acetaminophen Itching and Rash      Current Outpatient Medications   Medication Sig Dispense Refill    hydroCHLOROthiazide (HYDRODIURIL) 25 MG tablet Take 1 tablet (25 mg total) by mouth once daily. 30 tablet 11    ibuprofen (ADVIL,MOTRIN) 600 MG tablet Take 600 mg by mouth every 6 (six) hours.      metFORMIN (GLUCOPHAGE) 850 MG tablet Take 1 tablet (850 mg total) by mouth 2 (two) times daily with meals. 180 tablet 3    norethindrone-ethinyl estradiol (MICROGESTIN 1/20) 1-20 mg-mcg per tablet TAKE 1 TABLET BY MOUTH ONCE DAILY 21 tablet 11    semaglutide (OZEMPIC) 1 mg/dose (4 mg/3 mL) Inject 1 mg into the skin every 7 days. 1 pen 11     No current facility-administered medications for this visit.     Social History     Socioeconomic History    Marital status:    Occupational History    Occupation:    Tobacco Use    Smoking status: Never Smoker    Smokeless tobacco: Never Used   Substance and Sexual Activity    Alcohol use: Never    Drug use: Never    Sexual activity: Yes     Partners: Male     Birth control/protection: Partner-Vasectomy      Family History   Problem Relation Age of Onset    Diabetes Mother     Diabetes Father     Cancer Maternal Aunt     Cancer Maternal Uncle     Cancer Paternal Aunt         Objective:      Physical Exam  Constitutional:       Appearance: Normal appearance.   Pulmonary:      Effort: Pulmonary effort is normal.   Musculoskeletal:         General: Tenderness (trigger finger 1st digit right/left hand) present. No swelling, deformity or signs of injury.   Skin:     General: Skin is warm and dry.   Neurological:      Mental Status: She is alert and oriented to person, place, and time.   Psychiatric:         Mood and Affect: Mood normal.         Behavior: Behavior normal.         Assessment:     1. Insulin resistance  Sub-optimally controlled   2. Metabolic disorder Sub-optimally controlled   3. Prediabetes Sub-optimally controlled   4. Bilateral hand pain Active   5. Class 1 obesity due to excess calories with serious comorbidity and body mass index (BMI) of 33.0 to 33.9 in adult Active     Plan:       PROBLEM LIST     Insulin resistance  Comments:  A1c 6.4% today; not improving w/ diet and exercise. Compliant with metforming. Will order weekly Ozempic adjunct therapy.   Orders:  -     semaglutide (OZEMPIC) 1 mg/dose (4 mg/3 mL); Inject 1 mg into the skin every 7 days.  Dispense: 1 pen; Refill: 11    Metabolic disorder  -     semaglutide (OZEMPIC) 1 mg/dose (4 mg/3 mL); Inject 1 mg into the skin every 7 days.  Dispense: 1 pen; Refill: 11    Prediabetes    Bilateral hand pain  Comments:  xrays ordered; will notify w/ resuls; consider Celebrex on demand  Orders:  -     X-Ray Hand Complete Left; Future; Expected date: 04/20/2022  -     X-Ray Hand Complete Right; Future; Expected date: 04/20/2022    Class 1 obesity due to excess calories with serious comorbidity and body mass index (BMI) of 33.0 to 33.9 in adult  Comments:  walking as form of exercise most days of the week; appetite suppressant through [employer] or [facility] may help with weight loss efforts

## 2022-04-21 RX ORDER — ONDANSETRON HYDROCHLORIDE 8 MG/1
8 TABLET, FILM COATED ORAL EVERY 8 HOURS PRN
Qty: 30 TABLET | Refills: 1 | Status: SHIPPED | OUTPATIENT
Start: 2022-04-21

## 2022-08-16 ENCOUNTER — PATIENT OUTREACH (OUTPATIENT)
Dept: ADMINISTRATIVE | Facility: HOSPITAL | Age: 47
End: 2022-08-16
Payer: COMMERCIAL

## 2022-08-16 NOTE — PROGRESS NOTES
Working the  Colon Report. No colon records found in [facility] or [facility]

## 2022-08-22 ENCOUNTER — TELEPHONE (OUTPATIENT)
Dept: FAMILY MEDICINE | Facility: CLINIC | Age: 47
End: 2022-08-22
Payer: COMMERCIAL

## 2022-08-22 NOTE — TELEPHONE ENCOUNTER
----- Message from Zainab Villanueva sent at 8/22/2022  9:25 AM CDT -----  Alyssa Titus is calling to get orders for an mri of both of her hips. Please give her a call back with an update on this request [phone] (home)

## 2022-08-29 ENCOUNTER — TELEPHONE (OUTPATIENT)
Dept: OBSTETRICS AND GYNECOLOGY | Facility: CLINIC | Age: 47
End: 2022-08-29
Payer: COMMERCIAL

## 2022-11-16 ENCOUNTER — PATIENT MESSAGE (OUTPATIENT)
Dept: ADMINISTRATIVE | Facility: HOSPITAL | Age: 47
End: 2022-11-16
Payer: COMMERCIAL

## 2022-11-21 DIAGNOSIS — R73.03 PREDIABETES: ICD-10-CM

## 2022-11-21 RX ORDER — METFORMIN HYDROCHLORIDE 850 MG/1
850 TABLET ORAL 2 TIMES DAILY WITH MEALS
Qty: 60 TABLET | Refills: 0 | Status: SHIPPED | OUTPATIENT
Start: 2022-11-21 | End: 2022-12-21 | Stop reason: SDUPTHER

## 2022-12-15 ENCOUNTER — LAB VISIT (OUTPATIENT)
Dept: FAMILY MEDICINE | Facility: CLINIC | Age: 47
End: 2022-12-15
Payer: COMMERCIAL

## 2022-12-15 DIAGNOSIS — E88.9 METABOLIC DISORDER: ICD-10-CM

## 2022-12-15 DIAGNOSIS — R73.03 PREDIABETES: Primary | ICD-10-CM

## 2022-12-15 DIAGNOSIS — Z11.59 ENCOUNTER FOR SCREENING FOR OTHER VIRAL DISEASES: ICD-10-CM

## 2022-12-15 LAB
ABS NRBC COUNT: 0 THOU/UL (ref 0–0.01)
ABSOLUTE BASOPHIL: 0.1 10*3/UL (ref 0–0.3)
ABSOLUTE EOSINOPHIL: 0.1 10*3/UL (ref 0–0.6)
ABSOLUTE IMMATURE GRAN: 0.04 THOU/UL (ref 0–0.03)
ABSOLUTE LYMPHOCYTE: 1.9 10*3/UL (ref 1.2–4)
ABSOLUTE MONOCYTE: 0.6 10*3/UL (ref 0.1–0.8)
ALBUMIN SERPL BCP-MCNC: 3.6 G/DL (ref 3.4–5)
ALP SERPL-CCNC: 59 U/L (ref 45–117)
ALT SERPL W P-5'-P-CCNC: 55 U/L (ref 13–56)
ANION GAP SERPL CALC-SCNC: 9 MMOL/L (ref 3–11)
AST SERPL-CCNC: 29 U/L (ref 15–37)
BASOPHILS NFR BLD: 0.9 % (ref 0–3)
BILIRUB SERPL-MCNC: 0.5 MG/DL (ref 0.2–1)
BUN SERPL-MCNC: 15 MG/DL (ref 7–18)
BUN/CREAT SERPL: 25.42 RATIO
CALCIUM SERPL-MCNC: 8.5 MG/DL (ref 8.5–10.1)
CHLORIDE SERPL-SCNC: 105 MMOL/L (ref 98–107)
CHOLEST SERPL-MSCNC: 176 MG/DL
CO2 SERPL-SCNC: 25 MMOL/L (ref 21–32)
CREAT SERPL-MCNC: 0.59 MG/DL (ref 0.55–1.02)
EOSINOPHIL NFR BLD: 1.5 % (ref 0–6)
ERYTHROCYTE [DISTWIDTH] IN BLOOD BY AUTOMATED COUNT: 13.7 % (ref 0–15.5)
ESTIMATED AVG GLUCOSE: 133 MG/DL
GFR ESTIMATION: > 60
GLUCOSE SERPL-MCNC: 107 MG/DL (ref 74–106)
HBA1C MFR BLD: 5.9 % (ref 4.2–6.3)
HCT VFR BLD AUTO: 42 % (ref 37–47)
HCV AB SERPL QL IA: NONREACTIVE
HDLC SERPL-MCNC: 59 MG/DL
HGB BLD-MCNC: 13.6 G/DL (ref 12–16)
IMMATURE GRANULOCYTES: 0.5 % (ref 0–0.43)
LDLC SERPL CALC-MCNC: 70.4 MG/DL
LYMPHOCYTES NFR BLD: 24.2 % (ref 20–45)
MCH RBC QN AUTO: 26.7 PG (ref 27–32)
MCHC RBC AUTO-ENTMCNC: 32.4 % (ref 32–36)
MCV RBC AUTO: 82.5 FL (ref 80–99)
MONOCYTES NFR BLD: 7.7 % (ref 2–10)
NEUTROPHILS # BLD AUTO: 5.2 10*3/UL (ref 1.4–7)
NEUTROPHILS NFR BLD: 65.2 % (ref 50–80)
NUCLEATED RED BLOOD CELLS: 0 % (ref 0–0.2)
PLATELETS: 353 10*3/UL (ref 130–400)
PMV BLD AUTO: 9.2 FL (ref 9.2–12.2)
POTASSIUM SERPL-SCNC: 4.4 MMOL/L (ref 3.5–5.1)
PROT SERPL-MCNC: 6.9 G/DL (ref 6.4–8.2)
RBC # BLD AUTO: 5.09 10*6/UL (ref 4.2–5.4)
SODIUM BLD-SCNC: 139 MMOL/L (ref 131–143)
T4 FREE SP9 P CHAL SERPL-SCNC: 1.01 NG/DL (ref 0.76–1.46)
TRIGL SERPL-MCNC: 233 MG/DL (ref 0–149)
TSH SERPL DL<=0.005 MIU/L-ACNC: 2.08 UIU/ML (ref 0.36–3.74)
VLDL CHOLESTEROL: 47 MG/DL
WBC # BLD: 7.9 10*3/UL (ref 4.5–10)

## 2022-12-21 ENCOUNTER — OFFICE VISIT (OUTPATIENT)
Dept: FAMILY MEDICINE | Facility: CLINIC | Age: 47
End: 2022-12-21
Payer: COMMERCIAL

## 2022-12-21 VITALS
HEART RATE: 92 BPM | HEIGHT: 63 IN | OXYGEN SATURATION: 95 % | DIASTOLIC BLOOD PRESSURE: 80 MMHG | RESPIRATION RATE: 18 BRPM | WEIGHT: 174 LBS | BODY MASS INDEX: 30.83 KG/M2 | TEMPERATURE: 98 F | SYSTOLIC BLOOD PRESSURE: 126 MMHG

## 2022-12-21 DIAGNOSIS — E88.9 METABOLIC DISORDER: Chronic | ICD-10-CM

## 2022-12-21 DIAGNOSIS — E88.819 INSULIN RESISTANCE: Chronic | ICD-10-CM

## 2022-12-21 DIAGNOSIS — R73.03 PREDIABETES: Primary | Chronic | ICD-10-CM

## 2022-12-21 DIAGNOSIS — F41.9 ACUTE ANXIETY: ICD-10-CM

## 2022-12-21 DIAGNOSIS — Z12.31 BREAST CANCER SCREENING BY MAMMOGRAM: ICD-10-CM

## 2022-12-21 DIAGNOSIS — E66.09 CLASS 1 OBESITY DUE TO EXCESS CALORIES WITH SERIOUS COMORBIDITY AND BODY MASS INDEX (BMI) OF 30.0 TO 30.9 IN ADULT: Chronic | ICD-10-CM

## 2022-12-21 DIAGNOSIS — I15.2 HYPERTENSION DUE TO ENDOCRINE DISORDER: Chronic | ICD-10-CM

## 2022-12-21 PROBLEM — E66.811 CLASS 1 OBESITY DUE TO EXCESS CALORIES WITH SERIOUS COMORBIDITY AND BODY MASS INDEX (BMI) OF 30.0 TO 30.9 IN ADULT: Chronic | Status: ACTIVE | Noted: 2022-12-21

## 2022-12-21 PROBLEM — Z98.890 HISTORY OF BACK SURGERY: Status: ACTIVE | Noted: 2022-12-21

## 2022-12-21 PROBLEM — R59.0 REACTIVE CERVICAL LYMPHADENOPATHY: Status: RESOLVED | Noted: 2020-12-09 | Resolved: 2022-12-21

## 2022-12-21 PROCEDURE — 3074F PR MOST RECENT SYSTOLIC BLOOD PRESSURE < 130 MM HG: ICD-10-PCS | Mod: CPTII,S$GLB,, | Performed by: NURSE PRACTITIONER

## 2022-12-21 PROCEDURE — 3008F PR BODY MASS INDEX (BMI) DOCUMENTED: ICD-10-PCS | Mod: CPTII,S$GLB,, | Performed by: NURSE PRACTITIONER

## 2022-12-21 PROCEDURE — 3074F SYST BP LT 130 MM HG: CPT | Mod: CPTII,S$GLB,, | Performed by: NURSE PRACTITIONER

## 2022-12-21 PROCEDURE — 1160F PR REVIEW ALL MEDS BY PRESCRIBER/CLIN PHARMACIST DOCUMENTED: ICD-10-PCS | Mod: CPTII,S$GLB,, | Performed by: NURSE PRACTITIONER

## 2022-12-21 PROCEDURE — 1159F PR MEDICATION LIST DOCUMENTED IN MEDICAL RECORD: ICD-10-PCS | Mod: CPTII,S$GLB,, | Performed by: NURSE PRACTITIONER

## 2022-12-21 PROCEDURE — 1160F RVW MEDS BY RX/DR IN RCRD: CPT | Mod: CPTII,S$GLB,, | Performed by: NURSE PRACTITIONER

## 2022-12-21 PROCEDURE — 3079F PR MOST RECENT DIASTOLIC BLOOD PRESSURE 80-89 MM HG: ICD-10-PCS | Mod: CPTII,S$GLB,, | Performed by: NURSE PRACTITIONER

## 2022-12-21 PROCEDURE — 3044F HG A1C LEVEL LT 7.0%: CPT | Mod: CPTII,S$GLB,, | Performed by: NURSE PRACTITIONER

## 2022-12-21 PROCEDURE — 99214 OFFICE O/P EST MOD 30 MIN: CPT | Mod: S$GLB,,, | Performed by: NURSE PRACTITIONER

## 2022-12-21 PROCEDURE — 1159F MED LIST DOCD IN RCRD: CPT | Mod: CPTII,S$GLB,, | Performed by: NURSE PRACTITIONER

## 2022-12-21 PROCEDURE — 99214 PR OFFICE/OUTPT VISIT, EST, LEVL IV, 30-39 MIN: ICD-10-PCS | Mod: S$GLB,,, | Performed by: NURSE PRACTITIONER

## 2022-12-21 PROCEDURE — 3044F PR MOST RECENT HEMOGLOBIN A1C LEVEL <7.0%: ICD-10-PCS | Mod: CPTII,S$GLB,, | Performed by: NURSE PRACTITIONER

## 2022-12-21 PROCEDURE — 3079F DIAST BP 80-89 MM HG: CPT | Mod: CPTII,S$GLB,, | Performed by: NURSE PRACTITIONER

## 2022-12-21 PROCEDURE — 3008F BODY MASS INDEX DOCD: CPT | Mod: CPTII,S$GLB,, | Performed by: NURSE PRACTITIONER

## 2022-12-21 RX ORDER — ALPRAZOLAM 0.5 MG/1
0.5 TABLET ORAL DAILY PRN
Qty: 20 TABLET | Refills: 0 | Status: SHIPPED | OUTPATIENT
Start: 2022-12-21

## 2022-12-21 RX ORDER — HYDROCHLOROTHIAZIDE 25 MG/1
25 TABLET ORAL DAILY
Qty: 30 TABLET | Refills: 11 | Status: SHIPPED | OUTPATIENT
Start: 2022-12-21 | End: 2024-01-10

## 2022-12-21 RX ORDER — METFORMIN HYDROCHLORIDE 850 MG/1
850 TABLET ORAL 2 TIMES DAILY WITH MEALS
Qty: 180 TABLET | Refills: 3 | Status: SHIPPED | OUTPATIENT
Start: 2022-12-21 | End: 2024-03-05 | Stop reason: SDUPTHER

## 2022-12-21 RX ORDER — SEMAGLUTIDE 1.34 MG/ML
1 INJECTION, SOLUTION SUBCUTANEOUS
Qty: 1 PEN | Refills: 11 | Status: SHIPPED | OUTPATIENT
Start: 2022-12-21 | End: 2023-01-03

## 2022-12-21 NOTE — PROGRESS NOTES
Subjective:       Patient ID: Alyssa Titus is a 46 y.o. female.    Chief Complaint: Follow-up (Pt is here for a routine check up and lab review.)    She is 46 yr old female, she is turning 37 next week. She lives in [place]. She is a . She is . 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 She had 3 back surgeries for herniated discs--Dr Girish lopez 2001 & Dr Eaton at [facility] 1996.     She is currently being treated for prediabetes, obesity, and HTN. Her BP is controlled with HCTZ and has improved with weight loss. She is on metformin and Ozempic. Started GLP agonist in April 2022. She's had a 15 lb documented weight loss since our previous visit. She feels that she is at a plateau in her weight loss efforts. She is declining vaccines. She is currently due for her annual mammogram. She is due for her initial colorectal screen.     She reports increase in stress and anxiety--all appears to be situational. She is currently building a new house with her . Also her daughter is getting  in January. Finances are strained, house is running over-budget, and the wedding is costly. She is scheduled for vacation in Feb and she suspects her stress will reduce after the wedding.     Review of Systems   Constitutional:  Negative for activity change, appetite change and fever.   Respiratory:  Negative for chest tightness and shortness of breath.    Cardiovascular:  Negative for chest pain and palpitations.   Gastrointestinal:  Negative for abdominal pain, nausea and vomiting.   Neurological:  Negative for dizziness, light-headedness and headaches.   Psychiatric/Behavioral:  Negative for dysphoric mood and sleep disturbance. The patient is nervous/anxious.          Past Medical History:  Past Medical History:   Diagnosis Date    Cancer     Class 1 obesity due to  excess calories with serious comorbidity and body mass index (BMI) of 30.0 to 30.9 in adult     Prediabetes       Past Surgical History:   Procedure Laterality Date     SECTION      DILATION AND CURETTAGE OF UTERUS      SPINE SURGERY        Review of patient's allergies indicates:   Allergen Reactions    Morphine Itching, Rash and Shortness Of Breath    Morphine (pf) Itching    Propoxyphene n-acetaminophen Itching and Rash      Current Outpatient Medications   Medication Sig Dispense Refill    ibuprofen (ADVIL,MOTRIN) 600 MG tablet Take 600 mg by mouth every 6 (six) hours.      ondansetron (ZOFRAN) 8 MG tablet Take 1 tablet (8 mg total) by mouth every 8 (eight) hours as needed for Nausea. 30 tablet 1    semaglutide (OZEMPIC) 1 mg/dose (4 mg/3 mL) Inject 1 mg into the skin every 7 days. 1 pen 11    ALPRAZolam (XANAX) 0.5 MG tablet Take 1 tablet (0.5 mg total) by mouth daily as needed for Anxiety. 20 tablet 0    hydroCHLOROthiazide (HYDRODIURIL) 25 MG tablet Take 1 tablet (25 mg total) by mouth once daily. 30 tablet 11    metFORMIN (GLUCOPHAGE) 850 MG tablet Take 1 tablet (850 mg total) by mouth 2 (two) times daily with meals. 180 tablet 3     No current facility-administered medications for this visit.     Social History     Socioeconomic History    Marital status:    Occupational History    Occupation:    Tobacco Use    Smoking status: Never    Smokeless tobacco: Never   Substance and Sexual Activity    Alcohol use: Never    Drug use: Never    Sexual activity: Yes     Partners: Male     Birth control/protection: Partner-Vasectomy      Family History   Problem Relation Age of Onset    Diabetes Mother     Diabetes Father     Cancer Maternal Aunt     Cancer Maternal Uncle     Cancer Paternal Aunt         Objective:      Physical Exam  Constitutional:       Appearance: She is well-developed.   HENT:      Head: Normocephalic and atraumatic.   Eyes:      General: No scleral icterus.      Conjunctiva/sclera: Conjunctivae normal.      Pupils: Pupils are equal, round, and reactive to light.   Neck:      Thyroid: No thyroid mass.      Trachea: Trachea normal.   Pulmonary:      Effort: Pulmonary effort is normal.   Musculoskeletal:      Cervical back: Normal range of motion and neck supple.   Neurological:      Mental Status: She is alert and oriented to person, place, and time.   Psychiatric:         Mood and Affect: Mood normal.         Behavior: Behavior normal.       Assessment:     1. Prediabetes Stable   2. Insulin resistance Stable   3. Metabolic disorder Stable   4. Hypertension due to endocrine disorder Stable   5. Acute anxiety Acute   6. Class 1 obesity due to excess calories with serious comorbidity and body mass index (BMI) of 30.0 to 30.9 in adult Active   7. Breast cancer screening by mammogram      Plan:       PROBLEM LIST     Prediabetes  Comments:  A1c has reduced from 6.4% to 5.9%--excellent; continue metformin and Ozempic as directed.   Orders:  -     metFORMIN (GLUCOPHAGE) 850 MG tablet; Take 1 tablet (850 mg total) by mouth 2 (two) times daily with meals.  Dispense: 180 tablet; Refill: 3  -     semaglutide (OZEMPIC) 1 mg/dose (4 mg/3 mL); Inject 1 mg into the skin every 7 days.  Dispense: 1 pen; Refill: 11    Insulin resistance  Comments:      Orders:  -     semaglutide (OZEMPIC) 1 mg/dose (4 mg/3 mL); Inject 1 mg into the skin every 7 days.  Dispense: 1 pen; Refill: 11    Metabolic disorder  -     semaglutide (OZEMPIC) 1 mg/dose (4 mg/3 mL); Inject 1 mg into the skin every 7 days.  Dispense: 1 pen; Refill: 11    Hypertension due to endocrine disorder  Comments:  controlled with HCTZ  Orders:  -     hydroCHLOROthiazide (HYDRODIURIL) 25 MG tablet; Take 1 tablet (25 mg total) by mouth once daily.  Dispense: 30 tablet; Refill: 11    Acute anxiety  -     ALPRAZolam (XANAX) 0.5 MG tablet; Take 1 tablet (0.5 mg total) by mouth daily as needed for Anxiety.  Dispense: 20 tablet; Refill:  0    Class 1 obesity due to excess calories with serious comorbidity and body mass index (BMI) of 30.0 to 30.9 in adult  Comments:  increasing her Ozempic from 1 mg to 2 mg; continue routine exercies and following diet with low glycemic index    Breast cancer screening by mammogram  -     Mammo Digital Screening Bilat; Future; Expected date: 12/21/2023      RTC in 6 mo to check weight; She is agreeable to colorectal screen at next appt. Ordering annual mammo today.

## 2022-12-30 ENCOUNTER — PATIENT MESSAGE (OUTPATIENT)
Dept: FAMILY MEDICINE | Facility: CLINIC | Age: 47
End: 2022-12-30
Payer: COMMERCIAL

## 2023-01-03 DIAGNOSIS — E88.819 INSULIN RESISTANCE: Chronic | ICD-10-CM

## 2023-01-03 DIAGNOSIS — R73.03 PREDIABETES: Chronic | ICD-10-CM

## 2023-01-03 DIAGNOSIS — E88.9 METABOLIC DISORDER: Chronic | ICD-10-CM

## 2023-01-03 RX ORDER — SEMAGLUTIDE 2.68 MG/ML
2 INJECTION, SOLUTION SUBCUTANEOUS
Qty: 1 PEN | Refills: 11 | Status: SHIPPED | OUTPATIENT
Start: 2023-01-03 | End: 2023-01-06

## 2023-01-05 ENCOUNTER — PATIENT MESSAGE (OUTPATIENT)
Dept: FAMILY MEDICINE | Facility: CLINIC | Age: 48
End: 2023-01-05
Payer: COMMERCIAL

## 2023-01-06 ENCOUNTER — PATIENT MESSAGE (OUTPATIENT)
Dept: FAMILY MEDICINE | Facility: CLINIC | Age: 48
End: 2023-01-06
Payer: COMMERCIAL

## 2023-01-06 DIAGNOSIS — E88.9 METABOLIC DISORDER: ICD-10-CM

## 2023-01-06 DIAGNOSIS — R73.03 PREDIABETES: Primary | ICD-10-CM

## 2023-01-06 DIAGNOSIS — E88.819 INSULIN RESISTANCE: ICD-10-CM

## 2023-01-06 RX ORDER — SEMAGLUTIDE 1.34 MG/ML
1 INJECTION, SOLUTION SUBCUTANEOUS
Qty: 1 PEN | Refills: 11 | Status: SHIPPED | OUTPATIENT
Start: 2023-01-06 | End: 2023-02-20

## 2023-01-16 ENCOUNTER — PATIENT MESSAGE (OUTPATIENT)
Dept: FAMILY MEDICINE | Facility: CLINIC | Age: 48
End: 2023-01-16
Payer: COMMERCIAL

## 2023-02-20 ENCOUNTER — PATIENT MESSAGE (OUTPATIENT)
Dept: FAMILY MEDICINE | Facility: CLINIC | Age: 48
End: 2023-02-20
Payer: COMMERCIAL

## 2023-02-20 DIAGNOSIS — E88.819 INSULIN RESISTANCE: ICD-10-CM

## 2023-02-20 DIAGNOSIS — R73.03 PREDIABETES: Primary | ICD-10-CM

## 2023-02-20 DIAGNOSIS — E88.9 METABOLIC DISORDER: ICD-10-CM

## 2023-02-20 RX ORDER — TIRZEPATIDE 2.5 MG/.5ML
2.5 INJECTION, SOLUTION SUBCUTANEOUS
Qty: 4 PEN | Refills: 11 | Status: SHIPPED | OUTPATIENT
Start: 2023-02-20 | End: 2023-08-31 | Stop reason: ALTCHOICE

## 2023-03-23 ENCOUNTER — PATIENT MESSAGE (OUTPATIENT)
Dept: ADMINISTRATIVE | Facility: HOSPITAL | Age: 48
End: 2023-03-23
Payer: COMMERCIAL

## 2023-08-31 ENCOUNTER — OFFICE VISIT (OUTPATIENT)
Dept: FAMILY MEDICINE | Facility: CLINIC | Age: 48
End: 2023-08-31
Payer: COMMERCIAL

## 2023-08-31 VITALS
WEIGHT: 180 LBS | DIASTOLIC BLOOD PRESSURE: 79 MMHG | SYSTOLIC BLOOD PRESSURE: 131 MMHG | BODY MASS INDEX: 31.89 KG/M2 | HEIGHT: 63 IN | OXYGEN SATURATION: 98 % | HEART RATE: 99 BPM | RESPIRATION RATE: 18 BRPM

## 2023-08-31 DIAGNOSIS — E66.09 CLASS 1 OBESITY DUE TO EXCESS CALORIES WITH SERIOUS COMORBIDITY AND BODY MASS INDEX (BMI) OF 30.0 TO 30.9 IN ADULT: Chronic | ICD-10-CM

## 2023-08-31 DIAGNOSIS — I15.2 HYPERTENSION DUE TO ENDOCRINE DISORDER: Chronic | ICD-10-CM

## 2023-08-31 DIAGNOSIS — R73.03 PREDIABETES: Primary | Chronic | ICD-10-CM

## 2023-08-31 DIAGNOSIS — Z12.11 COLON CANCER SCREENING: ICD-10-CM

## 2023-08-31 DIAGNOSIS — M54.31 RIGHT SIDED SCIATICA: Chronic | ICD-10-CM

## 2023-08-31 PROCEDURE — 99214 PR OFFICE/OUTPT VISIT, EST, LEVL IV, 30-39 MIN: ICD-10-PCS | Mod: S$GLB,,, | Performed by: NURSE PRACTITIONER

## 2023-08-31 PROCEDURE — 3008F PR BODY MASS INDEX (BMI) DOCUMENTED: ICD-10-PCS | Mod: CPTII,S$GLB,, | Performed by: NURSE PRACTITIONER

## 2023-08-31 PROCEDURE — 3008F BODY MASS INDEX DOCD: CPT | Mod: CPTII,S$GLB,, | Performed by: NURSE PRACTITIONER

## 2023-08-31 PROCEDURE — 1159F MED LIST DOCD IN RCRD: CPT | Mod: CPTII,S$GLB,, | Performed by: NURSE PRACTITIONER

## 2023-08-31 PROCEDURE — 3078F PR MOST RECENT DIASTOLIC BLOOD PRESSURE < 80 MM HG: ICD-10-PCS | Mod: CPTII,S$GLB,, | Performed by: NURSE PRACTITIONER

## 2023-08-31 PROCEDURE — 1160F PR REVIEW ALL MEDS BY PRESCRIBER/CLIN PHARMACIST DOCUMENTED: ICD-10-PCS | Mod: CPTII,S$GLB,, | Performed by: NURSE PRACTITIONER

## 2023-08-31 PROCEDURE — 3075F PR MOST RECENT SYSTOLIC BLOOD PRESS GE 130-139MM HG: ICD-10-PCS | Mod: CPTII,S$GLB,, | Performed by: NURSE PRACTITIONER

## 2023-08-31 PROCEDURE — 3075F SYST BP GE 130 - 139MM HG: CPT | Mod: CPTII,S$GLB,, | Performed by: NURSE PRACTITIONER

## 2023-08-31 PROCEDURE — 1160F RVW MEDS BY RX/DR IN RCRD: CPT | Mod: CPTII,S$GLB,, | Performed by: NURSE PRACTITIONER

## 2023-08-31 PROCEDURE — 1159F PR MEDICATION LIST DOCUMENTED IN MEDICAL RECORD: ICD-10-PCS | Mod: CPTII,S$GLB,, | Performed by: NURSE PRACTITIONER

## 2023-08-31 PROCEDURE — 3078F DIAST BP <80 MM HG: CPT | Mod: CPTII,S$GLB,, | Performed by: NURSE PRACTITIONER

## 2023-08-31 PROCEDURE — 99214 OFFICE O/P EST MOD 30 MIN: CPT | Mod: S$GLB,,, | Performed by: NURSE PRACTITIONER

## 2023-08-31 RX ORDER — NORETHINDRONE ACETATE AND ETHINYL ESTRADIOL 1.5-30(21)
1 KIT ORAL NIGHTLY
COMMUNITY
Start: 2023-08-22 | End: 2024-03-05 | Stop reason: ALTCHOICE

## 2023-08-31 RX ORDER — SEMAGLUTIDE 2.68 MG/ML
2 INJECTION, SOLUTION SUBCUTANEOUS
COMMUNITY
Start: 2023-08-21 | End: 2024-01-29

## 2023-08-31 RX ORDER — DICLOFENAC SODIUM 75 MG/1
75 TABLET, DELAYED RELEASE ORAL DAILY PRN
Qty: 90 TABLET | Refills: 1 | Status: SHIPPED | OUTPATIENT
Start: 2023-08-31

## 2023-08-31 NOTE — PROGRESS NOTES
Subjective:       Patient ID: Alyssa Titus is a 47 y.o. female.    Chief Complaint: Follow-up (Pt is here for a 6 month follow up.)    She is having heavy periods... she anticipates hysterectomy soon. She is scheduled for upcoming appt with Dr Martinez. Also, she has upcoming appt with [facility] for hormone evaluation & mgmt.     She has history of 3 back surgeries. Seeing providers at [facility] and received prescription oral diclofenac. She would rather manage back pain conservatively w/ oral NSAIDs rather than LESI at present time. She is trying to lose weight and staying active playing [employer]. She is requesting refill of diclofenac.    She has prior hx of DM2, now she is now prediabetic. She is on weekly Ozempic. She is having difficult time losing weight w/ GLP1 RA.     She is due for colorectal screen. She is agreeable to Cologuard.       Review of Systems   Constitutional:  Negative for activity change, appetite change and fever.   Respiratory:  Negative for chest tightness and shortness of breath.    Cardiovascular:  Negative for chest pain and palpitations.   Gastrointestinal:  Negative for abdominal pain, nausea and vomiting.   Neurological:  Negative for dizziness, light-headedness and headaches.   Psychiatric/Behavioral:  Negative for dysphoric mood and sleep disturbance. The patient is nervous/anxious.            Past Medical History:  Past Medical History:   Diagnosis Date    Cancer     Class 1 obesity due to excess calories with serious comorbidity and body mass index (BMI) of 30.0 to 30.9 in adult     Prediabetes       Past Surgical History:   Procedure Laterality Date     SECTION      DILATION AND CURETTAGE OF UTERUS      SPINE SURGERY        Review of patient's allergies indicates:   Allergen Reactions    Morphine Itching, Rash and Shortness Of Breath    Morphine (pf) Itching    Propoxyphene n-acetaminophen Itching and Rash      Current Outpatient Medications   Medication Sig  Dispense Refill    ALPRAZolam (XANAX) 0.5 MG tablet Take 1 tablet (0.5 mg total) by mouth daily as needed for Anxiety. 20 tablet 0    BLISOVI FE 1.5/30, 28, 1.5 mg-30 mcg (21)/75 mg (7) tablet Take 1 tablet by mouth nightly.      hydroCHLOROthiazide (HYDRODIURIL) 25 MG tablet Take 1 tablet (25 mg total) by mouth once daily. 30 tablet 11    metFORMIN (GLUCOPHAGE) 850 MG tablet Take 1 tablet (850 mg total) by mouth 2 (two) times daily with meals. 180 tablet 3    ondansetron (ZOFRAN) 8 MG tablet Take 1 tablet (8 mg total) by mouth every 8 (eight) hours as needed for Nausea. 30 tablet 1    OZEMPIC 2 mg/dose (8 mg/3 mL) PnIj Inject 2 mg into the skin every 7 days.      diclofenac (VOLTAREN) 75 MG EC tablet Take 1 tablet (75 mg total) by mouth daily as needed (pain). 90 tablet 1     No current facility-administered medications for this visit.     Social History     Socioeconomic History    Marital status:    Occupational History    Occupation:    Tobacco Use    Smoking status: Never    Smokeless tobacco: Never   Substance and Sexual Activity    Alcohol use: Never    Drug use: Never    Sexual activity: Yes     Partners: Male     Birth control/protection: Partner-Vasectomy      Family History   Problem Relation Age of Onset    Diabetes Mother     Diabetes Father     Cancer Maternal Aunt     Cancer Maternal Uncle     Cancer Paternal Aunt         Objective:      Physical Exam  Constitutional:       Appearance: She is well-developed. She is obese.   HENT:      Head: Normocephalic and atraumatic.   Eyes:      General: No scleral icterus.     Conjunctiva/sclera: Conjunctivae normal.      Pupils: Pupils are equal, round, and reactive to light.   Neck:      Thyroid: No thyroid mass.      Trachea: Trachea normal.   Pulmonary:      Effort: Pulmonary effort is normal.   Musculoskeletal:      Cervical back: Normal range of motion and neck supple.   Neurological:      Mental Status: She is alert and oriented to  person, place, and time.   Psychiatric:         Mood and Affect: Mood normal.         Behavior: Behavior normal.         Assessment:     1. Prediabetes Active   2. Right sided sciatica Active   3. Hypertension due to endocrine disorder Stable   4. Class 1 obesity due to excess calories with serious comorbidity and body mass index (BMI) of 30.0 to 30.9 in adult Active   5. Colon cancer screening      Plan:       PROBLEM LIST     Prediabetes  Comments:  repeat A1c w/ full lab panel 6 mo    Right sided sciatica  Comments:  renewing diclofenac  Orders:  -     diclofenac (VOLTAREN) 75 MG EC tablet; Take 1 tablet (75 mg total) by mouth daily as needed (pain).  Dispense: 90 tablet; Refill: 1    Hypertension due to endocrine disorder    Class 1 obesity due to excess calories with serious comorbidity and body mass index (BMI) of 30.0 to 30.9 in adult  Comments:  recommend ADA or Mediterranean diet and at least 2.5 hr moderate intensity exercise weekly    Colon cancer screening  -     Cologuard Screening (Multitarget Stool DNA); Future; Expected date: 08/31/2023

## 2023-10-17 LAB
LEFT EYE DM RETINOPATHY: NEGATIVE
NONINV COLON CA DNA+OCC BLD SCRN STL QL: NEGATIVE
RIGHT EYE DM RETINOPATHY: NEGATIVE

## 2024-01-10 DIAGNOSIS — I15.2 HYPERTENSION DUE TO ENDOCRINE DISORDER: Chronic | ICD-10-CM

## 2024-01-10 RX ORDER — HYDROCHLOROTHIAZIDE 25 MG/1
25 TABLET ORAL DAILY
Qty: 90 TABLET | Refills: 3 | Status: SHIPPED | OUTPATIENT
Start: 2024-01-10

## 2024-01-29 RX ORDER — SEMAGLUTIDE 2.68 MG/ML
INJECTION, SOLUTION SUBCUTANEOUS
Qty: 3 ML | Refills: 10 | Status: SHIPPED | OUTPATIENT
Start: 2024-01-29 | End: 2024-03-05

## 2024-03-05 ENCOUNTER — OFFICE VISIT (OUTPATIENT)
Dept: FAMILY MEDICINE | Facility: CLINIC | Age: 49
End: 2024-03-05
Payer: COMMERCIAL

## 2024-03-05 VITALS
WEIGHT: 182 LBS | DIASTOLIC BLOOD PRESSURE: 82 MMHG | HEIGHT: 63 IN | SYSTOLIC BLOOD PRESSURE: 120 MMHG | HEART RATE: 88 BPM | OXYGEN SATURATION: 98 % | BODY MASS INDEX: 32.25 KG/M2 | RESPIRATION RATE: 18 BRPM

## 2024-03-05 DIAGNOSIS — D50.8 IRON DEFICIENCY ANEMIA DUE TO DIETARY CAUSES: Chronic | ICD-10-CM

## 2024-03-05 DIAGNOSIS — E03.8 SUBCLINICAL HYPOTHYROIDISM: ICD-10-CM

## 2024-03-05 DIAGNOSIS — E66.09 CLASS 1 OBESITY DUE TO EXCESS CALORIES WITH SERIOUS COMORBIDITY AND BODY MASS INDEX (BMI) OF 32.0 TO 32.9 IN ADULT: Chronic | ICD-10-CM

## 2024-03-05 DIAGNOSIS — E55.9 VITAMIN D DEFICIENCY: Chronic | ICD-10-CM

## 2024-03-05 DIAGNOSIS — E11.9 TYPE 2 DIABETES MELLITUS WITHOUT COMPLICATION, WITHOUT LONG-TERM CURRENT USE OF INSULIN: Primary | ICD-10-CM

## 2024-03-05 LAB
% SATURATION: 10 % (ref 20–50)
25(OH)D3 SERPL-MCNC: 24 NG/ML
ABS NRBC COUNT: 0 THOU/UL (ref 0–0.01)
ABSOLUTE BASOPHIL: 0.1 10*3/UL (ref 0–0.3)
ABSOLUTE EOSINOPHIL: 0.1 10*3/UL (ref 0–0.6)
ABSOLUTE IMMATURE GRAN: 0.02 THOU/UL (ref 0–0.03)
ABSOLUTE LYMPHOCYTE: 1.8 10*3/UL (ref 1.2–4)
ABSOLUTE MONOCYTE: 0.6 10*3/UL (ref 0.1–0.8)
ALBUMIN SERPL BCP-MCNC: 3.5 G/DL (ref 3.4–5)
ALP SERPL-CCNC: 73 U/L (ref 45–117)
ALT SERPL W P-5'-P-CCNC: 79 U/L (ref 13–56)
ANION GAP SERPL CALC-SCNC: 7 MMOL/L (ref 3–11)
AST SERPL-CCNC: 40 U/L (ref 15–37)
BASOPHILS NFR BLD: 0.8 % (ref 0–3)
BILIRUB SERPL-MCNC: 0.4 MG/DL (ref 0.2–1)
BUN SERPL-MCNC: 13 MG/DL (ref 7–18)
BUN/CREAT SERPL: 18.84 RATIO
CALCIUM SERPL-MCNC: 9.2 MG/DL (ref 8.5–10.1)
CHLORIDE SERPL-SCNC: 102 MMOL/L (ref 98–107)
CHOLEST SERPL-MSCNC: 167 MG/DL
CO2 SERPL-SCNC: 26 MMOL/L (ref 21–32)
CREAT SERPL-MCNC: 0.69 MG/DL (ref 0.55–1.02)
EOSINOPHIL NFR BLD: 1.3 % (ref 0–6)
ERYTHROCYTE [DISTWIDTH] IN BLOOD BY AUTOMATED COUNT: 15.5 % (ref 0–15.5)
ESTIMATED AVG GLUCOSE: 161 MG/DL
GFR ESTIMATION: > 60
GLUCOSE SERPL-MCNC: 123 MG/DL (ref 74–106)
HBA1C MFR BLD: 6.7 % (ref 4.2–6.3)
HCT VFR BLD AUTO: 41.6 % (ref 37–47)
HDLC SERPL-MCNC: 64 MG/DL
HGB BLD-MCNC: 13 G/DL (ref 12–16)
IMMATURE GRANULOCYTES: 0.3 % (ref 0–0.43)
IRON: 48 UG/DL (ref 50–170)
LDLC SERPL CALC-MCNC: 72.2 MG/DL
LYMPHOCYTES NFR BLD: 23.5 % (ref 20–45)
MCH RBC QN AUTO: 23.9 PG (ref 27–32)
MCHC RBC AUTO-ENTMCNC: 31.3 % (ref 32–36)
MCV RBC AUTO: 76.5 FL (ref 80–99)
MONOCYTES NFR BLD: 8.3 % (ref 2–10)
NEUTROPHILS # BLD AUTO: 4.9 10*3/UL (ref 1.4–7)
NEUTROPHILS NFR BLD: 65.8 % (ref 50–80)
NUCLEATED RED BLOOD CELLS: 0 % (ref 0–0.2)
PLATELETS: 357 10*3/UL (ref 130–400)
PMV BLD AUTO: 9.4 FL (ref 9.2–12.2)
POTASSIUM SERPL-SCNC: 3.8 MMOL/L (ref 3.5–5.1)
PROT SERPL-MCNC: 7.3 G/DL (ref 6.4–8.2)
RBC # BLD AUTO: 5.44 10*6/UL (ref 4.2–5.4)
SODIUM BLD-SCNC: 135 MMOL/L (ref 131–143)
T4 FREE SP9 P CHAL SERPL-SCNC: 0.98 NG/DL (ref 0.76–1.46)
TOTAL IRON BINDING CAPACITY: 462 UG/DL (ref 250–450)
TRIGL SERPL-MCNC: 154 MG/DL (ref 0–149)
TSH SERPL DL<=0.005 MIU/L-ACNC: 1.96 UIU/ML (ref 0.36–3.74)
VLDL CHOLESTEROL: 31 MG/DL
WBC # BLD: 7.4 10*3/UL (ref 4.5–10)

## 2024-03-05 PROCEDURE — 3074F SYST BP LT 130 MM HG: CPT | Mod: CPTII,S$GLB,, | Performed by: NURSE PRACTITIONER

## 2024-03-05 PROCEDURE — 99214 OFFICE O/P EST MOD 30 MIN: CPT | Mod: S$GLB,,, | Performed by: NURSE PRACTITIONER

## 2024-03-05 PROCEDURE — 1159F MED LIST DOCD IN RCRD: CPT | Mod: CPTII,S$GLB,, | Performed by: NURSE PRACTITIONER

## 2024-03-05 PROCEDURE — 1160F RVW MEDS BY RX/DR IN RCRD: CPT | Mod: CPTII,S$GLB,, | Performed by: NURSE PRACTITIONER

## 2024-03-05 PROCEDURE — 3079F DIAST BP 80-89 MM HG: CPT | Mod: CPTII,S$GLB,, | Performed by: NURSE PRACTITIONER

## 2024-03-05 PROCEDURE — 3044F HG A1C LEVEL LT 7.0%: CPT | Mod: CPTII,S$GLB,, | Performed by: NURSE PRACTITIONER

## 2024-03-05 PROCEDURE — 3008F BODY MASS INDEX DOCD: CPT | Mod: CPTII,S$GLB,, | Performed by: NURSE PRACTITIONER

## 2024-03-05 RX ORDER — METFORMIN HYDROCHLORIDE 850 MG/1
850 TABLET ORAL
Qty: 90 TABLET | Refills: 3 | Status: SHIPPED | OUTPATIENT
Start: 2024-03-05 | End: 2025-03-05

## 2024-03-05 RX ORDER — TIRZEPATIDE 5 MG/.5ML
5 INJECTION, SOLUTION SUBCUTANEOUS
Qty: 4 PEN | Refills: 11 | Status: SHIPPED | OUTPATIENT
Start: 2024-03-05 | End: 2024-04-02

## 2024-03-05 RX ORDER — ESTRADIOL 0.1 MG/D
1 PATCH, EXTENDED RELEASE TRANSDERMAL
COMMUNITY
Start: 2024-02-15

## 2024-03-05 RX ORDER — TIRZEPATIDE 5 MG/.5ML
5 INJECTION, SOLUTION SUBCUTANEOUS
Qty: 4 PEN | Refills: 11 | Status: SHIPPED | OUTPATIENT
Start: 2024-03-05 | End: 2024-03-05

## 2024-03-05 RX ORDER — TESTOSTERONE 25 MG/2.5G
GEL TRANSDERMAL
COMMUNITY
Start: 2024-02-06

## 2024-03-05 NOTE — PROGRESS NOTES
Subjective:       Patient ID: Alyssa Titus is a 48 y.o. female.    Chief Complaint: Follow-up (Pt is here for a 6 month follow up. Pt is also having some right ear pain.)    Complete hysterectomy Dec with Dr Demarco. Now on Estrogen patch and testosterone cream.     She has prior hx of DM2 for 10 years, now her A1c remains in the prediabetic range. She is on weekly Ozempic.... which has helped keep her mean glucose levels and A1c lower.  She is having difficult time losing weight w/ GLP1 RA... her weight remains about the same since our last appt despite eating clean and exercising.       Review of Systems   Constitutional:  Negative for activity change, appetite change and fever.   Respiratory:  Negative for chest tightness and shortness of breath.    Cardiovascular:  Negative for chest pain and palpitations.   Gastrointestinal:  Negative for abdominal pain, nausea and vomiting.   Neurological:  Negative for dizziness, light-headedness and headaches.   Psychiatric/Behavioral:  Negative for dysphoric mood and sleep disturbance. The patient is not nervous/anxious.            Past Medical History:  Past Medical History:   Diagnosis Date    Cancer     Class 1 obesity due to excess calories with serious comorbidity and body mass index (BMI) of 30.0 to 30.9 in adult     Prediabetes       Past Surgical History:   Procedure Laterality Date     SECTION      DILATION AND CURETTAGE OF UTERUS      SPINE SURGERY        Review of patient's allergies indicates:   Allergen Reactions    Morphine Itching, Rash and Shortness Of Breath    Morphine (pf) Itching    Propoxyphene n-acetaminophen Itching and Rash      Current Outpatient Medications   Medication Sig Dispense Refill    ALPRAZolam (XANAX) 0.5 MG tablet Take 1 tablet (0.5 mg total) by mouth daily as needed for Anxiety. 20 tablet 0    diclofenac (VOLTAREN) 75 MG EC tablet Take 1 tablet (75 mg total) by mouth daily as needed (pain). 90 tablet 1    RAMÓN 0.1 mg/24 hr  PTSW Place 1 patch onto the skin twice a week.      hydroCHLOROthiazide (HYDRODIURIL) 25 MG tablet Take 1 tablet (25 mg total) by mouth once daily. 90 tablet 3    ondansetron (ZOFRAN) 8 MG tablet Take 1 tablet (8 mg total) by mouth every 8 (eight) hours as needed for Nausea. 30 tablet 1    testosterone 1 % (25 mg/2.5gram) GlPk       metFORMIN (GLUCOPHAGE) 850 MG tablet Take 1 tablet (850 mg total) by mouth daily with breakfast. 90 tablet 3    tirzepatide (MOUNJARO) 5 mg/0.5 mL PnIj Inject 5 mg into the skin every 7 days. 4 pen 11     No current facility-administered medications for this visit.     Social History     Socioeconomic History    Marital status:    Occupational History    Occupation:    Tobacco Use    Smoking status: Never    Smokeless tobacco: Never   Substance and Sexual Activity    Alcohol use: Never    Drug use: Never    Sexual activity: Yes     Partners: Male     Birth control/protection: Partner-Vasectomy      Family History   Problem Relation Age of Onset    Diabetes Mother     Diabetes Father     Cancer Maternal Aunt     Cancer Maternal Uncle     Cancer Paternal Aunt         Objective:      Physical Exam  Constitutional:       Appearance: She is well-developed. She is obese.   HENT:      Head: Normocephalic and atraumatic.   Eyes:      General: No scleral icterus.     Conjunctiva/sclera: Conjunctivae normal.      Pupils: Pupils are equal, round, and reactive to light.   Neck:      Thyroid: No thyroid mass.      Trachea: Trachea normal.   Pulmonary:      Effort: Pulmonary effort is normal.   Musculoskeletal:      Cervical back: Normal range of motion and neck supple.   Neurological:      Mental Status: She is alert and oriented to person, place, and time.   Psychiatric:         Mood and Affect: Mood normal.         Behavior: Behavior normal.         Assessment:     1. Type 2 diabetes mellitus without complication, without long-term current use of insulin Active   2. Iron  deficiency anemia due to dietary causes Active   3. Vitamin D deficiency Active   4. Class 1 obesity due to excess calories with serious comorbidity and body mass index (BMI) of 32.0 to 32.9 in adult Active   5. Subclinical hypothyroidism Stable     Plan:       PROBLEM LIST     Type 2 diabetes mellitus without complication, without long-term current use of insulin  Comments:  A1c has increased from 5.9% to 6.7%; adjusting metformin to 850 mg daily; stopping Ozempic and staring Mounjaro.  Orders:  -     metFORMIN (GLUCOPHAGE) 850 MG tablet; Take 1 tablet (850 mg total) by mouth daily with breakfast.  Dispense: 90 tablet; Refill: 3  -     Hemoglobin A1C  -     Discontinue: tirzepatide (MOUNJARO) 5 mg/0.5 mL PnIj; Inject 5 mg into the skin every 7 days.  Dispense: 4 pen ; Refill: 11  -     tirzepatide (MOUNJARO) 5 mg/0.5 mL PnIj; Inject 5 mg into the skin every 7 days.  Dispense: 4 pen ; Refill: 11    Iron deficiency anemia due to dietary causes  Comments:  starting her on replacement  Orders:  -     CBC Auto Differential  -     Iron and TIBC    Vitamin D deficiency  Comments:  recommend OTC Vit D3 1000 IU daily  Orders:  -     Vitamin D    Class 1 obesity due to excess calories with serious comorbidity and body mass index (BMI) of 32.0 to 32.9 in adult  Comments:  recommend ADA or Mediterranean diet; chaning GLP1 RA to Mounjaro. Need to exercise at least 150 min weekly.    Subclinical hypothyroidism  Comments:  euthyroid  Orders:  -     CBC Auto Differential  -     Comprehensive Metabolic Panel  -     Lipid Panel  -     TSH+Free T4  -     T3, Free; Future; Expected date: 03/05/2024

## 2024-03-06 ENCOUNTER — PATIENT MESSAGE (OUTPATIENT)
Dept: FAMILY MEDICINE | Facility: CLINIC | Age: 49
End: 2024-03-06
Payer: COMMERCIAL

## 2024-03-06 PROBLEM — E03.8 SUBCLINICAL HYPOTHYROIDISM: Status: ACTIVE | Noted: 2024-03-06

## 2024-03-06 PROBLEM — E55.9 VITAMIN D DEFICIENCY: Status: ACTIVE | Noted: 2024-03-06

## 2024-03-06 PROBLEM — D50.8 IRON DEFICIENCY ANEMIA DUE TO DIETARY CAUSES: Chronic | Status: ACTIVE | Noted: 2024-03-06

## 2024-03-06 RX ORDER — FERROUS SULFATE 325(65) MG
325 TABLET ORAL 2 TIMES DAILY
Qty: 60 TABLET | Refills: 5 | Status: SHIPPED | OUTPATIENT
Start: 2024-03-06

## 2024-03-07 LAB — T3FREE SP1 P CHAL SERPL-SCNC: 3.3 PG/ML (ref 2.5–4.3)

## 2024-04-02 ENCOUNTER — OFFICE VISIT (OUTPATIENT)
Dept: FAMILY MEDICINE | Facility: CLINIC | Age: 49
End: 2024-04-02
Payer: COMMERCIAL

## 2024-04-02 VITALS
OXYGEN SATURATION: 99 % | HEIGHT: 63 IN | RESPIRATION RATE: 15 BRPM | BODY MASS INDEX: 32.6 KG/M2 | WEIGHT: 184 LBS | SYSTOLIC BLOOD PRESSURE: 134 MMHG | HEART RATE: 83 BPM | DIASTOLIC BLOOD PRESSURE: 74 MMHG

## 2024-04-02 DIAGNOSIS — E55.9 VITAMIN D DEFICIENCY: Chronic | ICD-10-CM

## 2024-04-02 DIAGNOSIS — E11.9 TYPE 2 DIABETES MELLITUS WITHOUT COMPLICATION, WITHOUT LONG-TERM CURRENT USE OF INSULIN: Primary | Chronic | ICD-10-CM

## 2024-04-02 DIAGNOSIS — E66.09 CLASS 1 OBESITY DUE TO EXCESS CALORIES WITH SERIOUS COMORBIDITY AND BODY MASS INDEX (BMI) OF 32.0 TO 32.9 IN ADULT: Chronic | ICD-10-CM

## 2024-04-02 DIAGNOSIS — D50.8 IRON DEFICIENCY ANEMIA DUE TO DIETARY CAUSES: Chronic | ICD-10-CM

## 2024-04-02 PROCEDURE — 3044F HG A1C LEVEL LT 7.0%: CPT | Mod: CPTII,S$GLB,, | Performed by: NURSE PRACTITIONER

## 2024-04-02 PROCEDURE — 1159F MED LIST DOCD IN RCRD: CPT | Mod: CPTII,S$GLB,, | Performed by: NURSE PRACTITIONER

## 2024-04-02 PROCEDURE — 99214 OFFICE O/P EST MOD 30 MIN: CPT | Mod: 25,S$GLB,, | Performed by: NURSE PRACTITIONER

## 2024-04-02 PROCEDURE — 3008F BODY MASS INDEX DOCD: CPT | Mod: CPTII,S$GLB,, | Performed by: NURSE PRACTITIONER

## 2024-04-02 PROCEDURE — 3075F SYST BP GE 130 - 139MM HG: CPT | Mod: CPTII,S$GLB,, | Performed by: NURSE PRACTITIONER

## 2024-04-02 PROCEDURE — 3078F DIAST BP <80 MM HG: CPT | Mod: CPTII,S$GLB,, | Performed by: NURSE PRACTITIONER

## 2024-04-02 PROCEDURE — 1160F RVW MEDS BY RX/DR IN RCRD: CPT | Mod: CPTII,S$GLB,, | Performed by: NURSE PRACTITIONER

## 2024-04-02 NOTE — PROGRESS NOTES
Subjective:       Patient ID: Alyssa Titus is a 48 y.o. female.    Chief Complaint: Med Change Follow Up (Denies N/V/D/C with Mounjaro )     She has prior hx of DM2 for 10 years, reversing the disease process for some time. Now, her A1c has fallen back into DM2 range. Last A1c 6.7%; She is on weekly Mounjaro.... which has helped keep her mean glucose levels lower.  She is having difficult time losing weight w/ GLP1 RA... her weight remains about the same since our last appt despite eating clean and exercising. She is interested in dose adjustment of Mounjaro. No adverse effects to the medication thus far.     Complete hysterectomy Dec. Her GYN is Dr Martinez. Now on Estrogen patch and testosterone cream.           Review of Systems   Constitutional:  Negative for activity change, appetite change and fever.   Respiratory:  Negative for chest tightness and shortness of breath.    Cardiovascular:  Negative for chest pain and palpitations.   Gastrointestinal:  Negative for abdominal pain, nausea and vomiting.   Neurological:  Negative for dizziness, light-headedness and headaches.   Psychiatric/Behavioral:  Negative for dysphoric mood and sleep disturbance. The patient is not nervous/anxious.            Past Medical History:  Past Medical History:   Diagnosis Date    Cancer     Class 1 obesity due to excess calories with serious comorbidity and body mass index (BMI) of 30.0 to 30.9 in adult     Prediabetes       Past Surgical History:   Procedure Laterality Date     SECTION      DILATION AND CURETTAGE OF UTERUS      SPINE SURGERY        Review of patient's allergies indicates:   Allergen Reactions    Morphine Itching, Rash and Shortness Of Breath    Morphine (pf) Itching    Propoxyphene n-acetaminophen Itching and Rash      Current Outpatient Medications   Medication Sig Dispense Refill    ALPRAZolam (XANAX) 0.5 MG tablet Take 1 tablet (0.5 mg total) by mouth daily as needed for Anxiety. 20 tablet 0    diclofenac  (VOLTAREN) 75 MG EC tablet Take 1 tablet (75 mg total) by mouth daily as needed (pain). 90 tablet 1    RAMÓN 0.1 mg/24 hr PTSW Place 1 patch onto the skin twice a week.      ferrous sulfate (FEOSOL) 325 mg (65 mg iron) Tab tablet Take 1 tablet (325 mg total) by mouth 2 (two) times daily. 60 tablet 5    hydroCHLOROthiazide (HYDRODIURIL) 25 MG tablet Take 1 tablet (25 mg total) by mouth once daily. 90 tablet 3    metFORMIN (GLUCOPHAGE) 850 MG tablet Take 1 tablet (850 mg total) by mouth daily with breakfast. 90 tablet 3    ondansetron (ZOFRAN) 8 MG tablet Take 1 tablet (8 mg total) by mouth every 8 (eight) hours as needed for Nausea. 30 tablet 1    testosterone 1 % (25 mg/2.5gram) GlPk       tirzepatide 7.5 mg/0.5 mL PnIj Inject 7.5 mg into the skin every 7 days. 4 Pen 11     No current facility-administered medications for this visit.     Social History     Socioeconomic History    Marital status:    Occupational History    Occupation:    Tobacco Use    Smoking status: Never    Smokeless tobacco: Never   Substance and Sexual Activity    Alcohol use: Never    Drug use: Never    Sexual activity: Yes     Partners: Male     Birth control/protection: Partner-Vasectomy     Social Determinants of Health     Financial Resource Strain: Low Risk  (4/1/2024)    Overall Financial Resource Strain (CARDIA)     Difficulty of Paying Living Expenses: Not hard at all   Food Insecurity: No Food Insecurity (4/1/2024)    Hunger Vital Sign     Worried About Running Out of Food in the Last Year: Never true     Ran Out of Food in the Last Year: Never true   Transportation Needs: No Transportation Needs (4/1/2024)    PRAPARE - Transportation     Lack of Transportation (Medical): No     Lack of Transportation (Non-Medical): No   Physical Activity: Unknown (4/1/2024)    Exercise Vital Sign     Days of Exercise per Week: 0 days   Stress: Stress Concern Present (4/1/2024)    [employer] -  Occupational Stress Questionnaire     Feeling of Stress : To some extent   Social Connections: Unknown (4/1/2024)    Social Connection and Isolation Panel [NHANES]     Frequency of Communication with Friends and Family: Three times a week     Frequency of Social Gatherings with Friends and Family: Once a week     Active Member of Clubs or Organizations: Yes     Attends Club or Organization Meetings: More than 4 times per year     Marital Status:    Housing Stability: Low Risk  (4/1/2024)    Housing Stability Vital Sign     Unable to Pay for Housing in the Last Year: No     Number of Places Lived in the Last Year: 1     Unstable Housing in the Last Year: No      Family History   Problem Relation Age of Onset    Diabetes Mother     Diabetes Father     Cancer Maternal Aunt     Cancer Maternal Uncle     Cancer Paternal Aunt         Objective:      Physical Exam  Constitutional:       Appearance: She is well-developed. She is obese.   HENT:      Head: Normocephalic and atraumatic.   Eyes:      General: No scleral icterus.     Conjunctiva/sclera: Conjunctivae normal.      Pupils: Pupils are equal, round, and reactive to light.   Neck:      Thyroid: No thyroid mass.      Trachea: Trachea normal.   Pulmonary:      Effort: Pulmonary effort is normal.   Musculoskeletal:      Cervical back: Normal range of motion and neck supple.   Neurological:      Mental Status: She is alert and oriented to person, place, and time.   Psychiatric:         Mood and Affect: Mood normal.         Behavior: Behavior normal.         Assessment:     1. Type 2 diabetes mellitus without complication, without long-term current use of insulin Stable   2. Iron deficiency anemia due to dietary causes Stable   3. Vitamin D deficiency Stable   4. Class 1 obesity due to excess calories with serious comorbidity and body mass index (BMI) of 32.0 to 32.9 in adult Active     Plan:       PROBLEM LIST     Type 2 diabetes mellitus without complication,  without long-term current use of insulin  Comments:  increasing her Mounjaro to 7.5 mg weekly  Orders:  -     tirzepatide 7.5 mg/0.5 mL PnIj; Inject 7.5 mg into the skin every 7 days.  Dispense: 4 Pen; Refill: 11    Iron deficiency anemia due to dietary causes  Comments:  some improvement in finger nail growth and fatigue symptoms since starting replacement    Vitamin D deficiency  Comments:  some improvement in finger nail growth and fatigue symptoms since starting replacement    Class 1 obesity due to excess calories with serious comorbidity and body mass index (BMI) of 32.0 to 32.9 in adult  Comments:  Continue to adhere to ADA diet and recommend at least 150 min exercise weekly

## 2024-04-23 DIAGNOSIS — E11.9 TYPE 2 DIABETES MELLITUS WITHOUT COMPLICATION, WITHOUT LONG-TERM CURRENT USE OF INSULIN: Primary | ICD-10-CM

## 2024-04-23 DIAGNOSIS — E11.9 TYPE 2 DIABETES MELLITUS WITHOUT COMPLICATION, WITHOUT LONG-TERM CURRENT USE OF INSULIN: ICD-10-CM

## 2024-04-23 DIAGNOSIS — E11.9 TYPE 2 DIABETES MELLITUS WITHOUT COMPLICATION, WITHOUT LONG-TERM CURRENT USE OF INSULIN: Chronic | ICD-10-CM

## 2024-04-24 ENCOUNTER — PATIENT MESSAGE (OUTPATIENT)
Dept: FAMILY MEDICINE | Facility: CLINIC | Age: 49
End: 2024-04-24
Payer: COMMERCIAL

## 2024-06-20 ENCOUNTER — PATIENT OUTREACH (OUTPATIENT)
Dept: ADMINISTRATIVE | Facility: HOSPITAL | Age: 49
End: 2024-06-20
Payer: COMMERCIAL

## 2024-09-13 ENCOUNTER — PATIENT MESSAGE (OUTPATIENT)
Dept: PRIMARY CARE CLINIC | Facility: CLINIC | Age: 49
End: 2024-09-13
Payer: COMMERCIAL

## 2024-09-22 ENCOUNTER — PATIENT MESSAGE (OUTPATIENT)
Dept: FAMILY MEDICINE | Facility: CLINIC | Age: 49
End: 2024-09-22
Payer: COMMERCIAL

## 2024-10-07 ENCOUNTER — PATIENT MESSAGE (OUTPATIENT)
Dept: FAMILY MEDICINE | Facility: CLINIC | Age: 49
End: 2024-10-07

## 2024-10-07 ENCOUNTER — OFFICE VISIT (OUTPATIENT)
Dept: FAMILY MEDICINE | Facility: CLINIC | Age: 49
End: 2024-10-07
Payer: COMMERCIAL

## 2024-10-07 VITALS
RESPIRATION RATE: 16 BRPM | BODY MASS INDEX: 30.12 KG/M2 | HEART RATE: 79 BPM | HEIGHT: 63 IN | SYSTOLIC BLOOD PRESSURE: 118 MMHG | DIASTOLIC BLOOD PRESSURE: 68 MMHG | OXYGEN SATURATION: 98 % | TEMPERATURE: 99 F | WEIGHT: 170 LBS

## 2024-10-07 DIAGNOSIS — E11.9 TYPE 2 DIABETES MELLITUS WITHOUT COMPLICATION, WITHOUT LONG-TERM CURRENT USE OF INSULIN: Primary | Chronic | ICD-10-CM

## 2024-10-07 DIAGNOSIS — E66.09 CLASS 1 OBESITY DUE TO EXCESS CALORIES WITH SERIOUS COMORBIDITY AND BODY MASS INDEX (BMI) OF 32.0 TO 32.9 IN ADULT: Chronic | ICD-10-CM

## 2024-10-07 DIAGNOSIS — E66.811 CLASS 1 OBESITY DUE TO EXCESS CALORIES WITH SERIOUS COMORBIDITY AND BODY MASS INDEX (BMI) OF 32.0 TO 32.9 IN ADULT: Chronic | ICD-10-CM

## 2024-10-07 DIAGNOSIS — D50.8 IRON DEFICIENCY ANEMIA DUE TO DIETARY CAUSES: Chronic | ICD-10-CM

## 2024-10-07 LAB
% SATURATION: 26 % (ref 20–50)
ABS NRBC COUNT: 0 THOU/UL (ref 0–0.01)
ABSOLUTE BASOPHIL: 0 10*3/UL (ref 0–0.3)
ABSOLUTE EOSINOPHIL: 0.2 10*3/UL (ref 0–0.6)
ABSOLUTE IMMATURE GRAN: 0.02 THOU/UL (ref 0–0.03)
ABSOLUTE LYMPHOCYTE: 1.6 10*3/UL (ref 1.2–4)
ABSOLUTE MONOCYTE: 0.8 10*3/UL (ref 0.1–0.8)
BASOPHILS NFR BLD: 0.6 % (ref 0–3)
CREATININE, URINE: 121.3 MG/DL (ref 10–175)
EOSINOPHIL NFR BLD: 3.5 % (ref 0–6)
ERYTHROCYTE [DISTWIDTH] IN BLOOD BY AUTOMATED COUNT: 12.8 % (ref 0–15.5)
HCT VFR BLD AUTO: 40.8 % (ref 37–47)
HGB BLD-MCNC: 14.2 G/DL (ref 12–16)
IMMATURE GRANULOCYTES: 0.3 % (ref 0–0.43)
IRON: 80 UG/DL (ref 50–170)
LYMPHOCYTES NFR BLD: 24 % (ref 20–45)
MCH RBC QN AUTO: 32.2 PG (ref 27–32)
MCHC RBC AUTO-ENTMCNC: 34.8 % (ref 32–36)
MCV RBC AUTO: 92.5 FL (ref 80–99)
MICROALBUMIN URINE: 5.6 MG/L (ref 0–20)
MICROALBUMIN/CREATININE RATIO: 4 MG/G (ref 0–30)
MONOCYTES NFR BLD: 12 % (ref 2–10)
NEUTROPHILS # BLD AUTO: 4 10*3/UL (ref 1.4–7)
NEUTROPHILS NFR BLD: 59.6 % (ref 50–80)
NUCLEATED RED BLOOD CELLS: 0 % (ref 0–0.2)
PLATELETS: 245 10*3/UL (ref 130–400)
PMV BLD AUTO: 10.8 FL (ref 9.2–12.2)
RBC # BLD AUTO: 4.41 10*6/UL (ref 4.2–5.4)
TOTAL IRON BINDING CAPACITY: 306 UG/DL (ref 250–450)
WBC # BLD: 6.7 10*3/UL (ref 4.5–10)

## 2024-10-07 PROCEDURE — 83036 HEMOGLOBIN GLYCOSYLATED A1C: CPT | Mod: QW,S$GLB,, | Performed by: NURSE PRACTITIONER

## 2024-10-07 PROCEDURE — 3044F HG A1C LEVEL LT 7.0%: CPT | Mod: CPTII,S$GLB,, | Performed by: NURSE PRACTITIONER

## 2024-10-07 PROCEDURE — 3074F SYST BP LT 130 MM HG: CPT | Mod: CPTII,S$GLB,, | Performed by: NURSE PRACTITIONER

## 2024-10-07 PROCEDURE — 99214 OFFICE O/P EST MOD 30 MIN: CPT | Mod: 25,S$GLB,, | Performed by: NURSE PRACTITIONER

## 2024-10-07 PROCEDURE — 1159F MED LIST DOCD IN RCRD: CPT | Mod: CPTII,S$GLB,, | Performed by: NURSE PRACTITIONER

## 2024-10-07 PROCEDURE — 1160F RVW MEDS BY RX/DR IN RCRD: CPT | Mod: CPTII,S$GLB,, | Performed by: NURSE PRACTITIONER

## 2024-10-07 PROCEDURE — 3008F BODY MASS INDEX DOCD: CPT | Mod: CPTII,S$GLB,, | Performed by: NURSE PRACTITIONER

## 2024-10-07 PROCEDURE — 3078F DIAST BP <80 MM HG: CPT | Mod: CPTII,S$GLB,, | Performed by: NURSE PRACTITIONER

## 2024-10-07 RX ORDER — TIRZEPATIDE 12.5 MG/.5ML
12.5 INJECTION, SOLUTION SUBCUTANEOUS
Qty: 2 ML | Refills: 5 | Status: SHIPPED | OUTPATIENT
Start: 2024-10-07

## 2024-10-07 NOTE — PROGRESS NOTES
Subjective:       Patient ID: Alyssa Titus is a 48 y.o. female.    Chief Complaint: Follow-up (Pt is here for a follow up and A1C check. Complains of some numbness in her toes. )    She has prior hx of DM2 for 10 years, reversing the disease process for some time. Now, her A1c has fallen back into DM2 range. Last A1c 6.7%; She is on weekly Mounjaro.... which has helped keep her mean glucose levels lower. She has lost 14 lb since her last appt 2024. Today her A1c is 5.8%. She is interested in dose adjustment of her Mounjaro.       Review of Systems   Constitutional:  Negative for activity change, appetite change and fever.   Respiratory:  Negative for chest tightness and shortness of breath.    Cardiovascular:  Negative for chest pain and palpitations.   Gastrointestinal:  Negative for abdominal pain, nausea and vomiting.   Neurological:  Negative for dizziness, light-headedness and headaches.   Psychiatric/Behavioral:  Negative for dysphoric mood and sleep disturbance. The patient is not nervous/anxious.            Past Medical History:  Past Medical History:   Diagnosis Date    Cancer     Class 1 obesity due to excess calories with serious comorbidity and body mass index (BMI) of 30.0 to 30.9 in adult     Prediabetes       Past Surgical History:   Procedure Laterality Date     SECTION      DILATION AND CURETTAGE OF UTERUS      SPINE SURGERY        Review of patient's allergies indicates:   Allergen Reactions    Morphine Itching, Rash and Shortness Of Breath    Morphine (pf) Itching    Propoxyphene n-acetaminophen Itching and Rash      Current Outpatient Medications   Medication Sig Dispense Refill    ALPRAZolam (XANAX) 0.5 MG tablet Take 1 tablet (0.5 mg total) by mouth daily as needed for Anxiety. 20 tablet 0    diclofenac (VOLTAREN) 75 MG EC tablet Take 1 tablet (75 mg total) by mouth daily as needed (pain). 90 tablet 1    RAMÓN 0.1 mg/24 hr PTSW Place 1 patch onto the skin twice a week.       ferrous sulfate (FEOSOL) 325 mg (65 mg iron) Tab tablet Take 1 tablet (325 mg total) by mouth 2 (two) times daily. 60 tablet 5    hydroCHLOROthiazide (HYDRODIURIL) 25 MG tablet Take 1 tablet (25 mg total) by mouth once daily. 90 tablet 3    metFORMIN (GLUCOPHAGE) 850 MG tablet Take 1 tablet (850 mg total) by mouth daily with breakfast. 90 tablet 3    ondansetron (ZOFRAN) 8 MG tablet Take 1 tablet (8 mg total) by mouth every 8 (eight) hours as needed for Nausea. 30 tablet 1    testosterone 1 % (25 mg/2.5gram) GlPk       tirzepatide (MOUNJARO) 12.5 mg/0.5 mL PnIj Inject 12.5 mg into the skin every 7 days. 2 mL 5     No current facility-administered medications for this visit.     Social History     Socioeconomic History    Marital status:    Occupational History    Occupation:    Tobacco Use    Smoking status: Never    Smokeless tobacco: Never   Substance and Sexual Activity    Alcohol use: Never    Drug use: Never    Sexual activity: Yes     Partners: Male     Birth control/protection: Partner-Vasectomy     Social Drivers of Health     Financial Resource Strain: Low Risk  (4/1/2024)    Overall Financial Resource Strain (CARDIA)     Difficulty of Paying Living Expenses: Not hard at all   Food Insecurity: No Food Insecurity (4/1/2024)    Hunger Vital Sign     Worried About Running Out of Food in the Last Year: Never true     Ran Out of Food in the Last Year: Never true   Transportation Needs: No Transportation Needs (4/1/2024)    PRAPARE - Transportation     Lack of Transportation (Medical): No     Lack of Transportation (Non-Medical): No   Physical Activity: Unknown (4/1/2024)    Exercise Vital Sign     Days of Exercise per Week: 0 days   Stress: Stress Concern Present (4/1/2024)    [employer] - Occupational Stress Questionnaire     Feeling of Stress : To some extent   Housing Stability: Low Risk  (4/1/2024)    Housing Stability Vital Sign     Unable to Pay for  Housing in the Last Year: No     Number of Places Lived in the Last Year: 1     Unstable Housing in the Last Year: No      Family History   Problem Relation Name Age of Onset    Diabetes Mother      Diabetes Father      Cancer Maternal Aunt      Cancer Maternal Uncle      Cancer Paternal Aunt          Objective:      Physical Exam  Constitutional:       Appearance: She is well-developed. She is obese.   HENT:      Head: Normocephalic and atraumatic.   Eyes:      General: No scleral icterus.     Conjunctiva/sclera: Conjunctivae normal.      Pupils: Pupils are equal, round, and reactive to light.   Neck:      Thyroid: No thyroid mass.      Trachea: Trachea normal.   Pulmonary:      Effort: Pulmonary effort is normal.   Musculoskeletal:      Cervical back: Normal range of motion and neck supple.   Neurological:      Mental Status: She is alert and oriented to person, place, and time.   Psychiatric:         Mood and Affect: Mood normal.         Behavior: Behavior normal.     Protective Sensation (w/ 10 gram monofilament):  Right: Intact  Left: Intact    Visual Inspection:  Normal -  Bilateral    Pedal Pulses:   Right: Present  Left: Present    Posterior Tibialis Pulses:   Right:Present  Left: Present     Assessment:     1. Type 2 diabetes mellitus without complication, without long-term current use of insulin    2. Iron deficiency anemia due to dietary causes    3. Vitamin D deficiency    4. Class 1 obesity due to excess calories with serious comorbidity and body mass index (BMI) of 32.0 to 32.9 in adult      Plan:       PROBLEM LIST     Type 2 diabetes mellitus without complication, without long-term current use of insulin  -     tirzepatide (MOUNJARO) 12.5 mg/0.5 mL PnIj; Inject 12.5 mg into the skin every 7 days.  Dispense: 2 mL; Refill: 5  -     Microalbumin/Creatinine Ratio, urine  -     Hemoglobin A1C, POCT    Iron deficiency anemia due to dietary causes  -     Iron and TIBC  -     CBC Auto Differential    Vitamin  D deficiency    Class 1 obesity due to excess calories with serious comorbidity and body mass index (BMI) of 32.0 to 32.9 in adult

## 2024-10-08 ENCOUNTER — PATIENT MESSAGE (OUTPATIENT)
Dept: FAMILY MEDICINE | Facility: CLINIC | Age: 49
End: 2024-10-08
Payer: COMMERCIAL

## 2024-12-04 ENCOUNTER — PATIENT MESSAGE (OUTPATIENT)
Dept: ADMINISTRATIVE | Facility: HOSPITAL | Age: 49
End: 2024-12-04
Payer: COMMERCIAL

## 2024-12-04 DIAGNOSIS — E11.9 TYPE 2 DIABETES MELLITUS WITHOUT COMPLICATION, UNSPECIFIED WHETHER LONG TERM INSULIN USE: ICD-10-CM

## 2025-01-13 LAB
LEFT EYE DM RETINOPATHY: NEGATIVE
RIGHT EYE DM RETINOPATHY: NEGATIVE

## 2025-01-28 ENCOUNTER — PATIENT MESSAGE (OUTPATIENT)
Dept: FAMILY MEDICINE | Facility: CLINIC | Age: 50
End: 2025-01-28
Payer: COMMERCIAL

## 2025-01-29 DIAGNOSIS — E11.9 TYPE 2 DIABETES MELLITUS WITHOUT COMPLICATION, WITHOUT LONG-TERM CURRENT USE OF INSULIN: Primary | ICD-10-CM

## 2025-02-20 DIAGNOSIS — D50.8 IRON DEFICIENCY ANEMIA DUE TO DIETARY CAUSES: Chronic | ICD-10-CM

## 2025-02-20 RX ORDER — FERROUS SULFATE 325(65) MG
TABLET ORAL 2 TIMES DAILY
Qty: 60 TABLET | Refills: 5 | Status: SHIPPED | OUTPATIENT
Start: 2025-02-20

## 2025-03-11 DIAGNOSIS — I15.2 HYPERTENSION DUE TO ENDOCRINE DISORDER: Chronic | ICD-10-CM

## 2025-03-11 DIAGNOSIS — E11.9 TYPE 2 DIABETES MELLITUS WITHOUT COMPLICATION, WITHOUT LONG-TERM CURRENT USE OF INSULIN: ICD-10-CM

## 2025-03-13 RX ORDER — HYDROCHLOROTHIAZIDE 25 MG/1
25 TABLET ORAL
Qty: 90 TABLET | Refills: 3 | Status: SHIPPED | OUTPATIENT
Start: 2025-03-13

## 2025-03-13 RX ORDER — METFORMIN HYDROCHLORIDE 850 MG/1
850 TABLET ORAL
Qty: 90 TABLET | Refills: 3 | Status: SHIPPED | OUTPATIENT
Start: 2025-03-13

## 2025-05-08 ENCOUNTER — TELEPHONE (OUTPATIENT)
Facility: CLINIC | Age: 50
End: 2025-05-08
Payer: COMMERCIAL

## 2025-06-03 ENCOUNTER — TELEPHONE (OUTPATIENT)
Facility: CLINIC | Age: 50
End: 2025-06-03
Payer: COMMERCIAL

## 2025-06-04 ENCOUNTER — PATIENT MESSAGE (OUTPATIENT)
Dept: FAMILY MEDICINE | Facility: CLINIC | Age: 50
End: 2025-06-04
Payer: COMMERCIAL

## 2025-06-04 ENCOUNTER — TELEPHONE (OUTPATIENT)
Dept: FAMILY MEDICINE | Facility: CLINIC | Age: 50
End: 2025-06-04
Payer: COMMERCIAL

## 2025-06-05 ENCOUNTER — TELEMEDICINE (OUTPATIENT)
Dept: FAMILY MEDICINE | Facility: CLINIC | Age: 50
End: 2025-06-05
Payer: COMMERCIAL

## 2025-06-05 VITALS — DIASTOLIC BLOOD PRESSURE: 75 MMHG | SYSTOLIC BLOOD PRESSURE: 130 MMHG

## 2025-06-05 DIAGNOSIS — K57.92 ACUTE DIVERTICULITIS: Primary | ICD-10-CM

## 2025-06-06 ENCOUNTER — PATIENT MESSAGE (OUTPATIENT)
Dept: FAMILY MEDICINE | Facility: CLINIC | Age: 50
End: 2025-06-06
Payer: COMMERCIAL

## 2025-07-23 ENCOUNTER — PATIENT MESSAGE (OUTPATIENT)
Dept: FAMILY MEDICINE | Facility: CLINIC | Age: 50
End: 2025-07-23
Payer: COMMERCIAL

## 2025-07-25 ENCOUNTER — OFFICE VISIT (OUTPATIENT)
Dept: FAMILY MEDICINE | Facility: CLINIC | Age: 50
End: 2025-07-25
Payer: COMMERCIAL

## 2025-07-25 VITALS
HEIGHT: 63 IN | TEMPERATURE: 98 F | BODY MASS INDEX: 28.53 KG/M2 | WEIGHT: 161 LBS | SYSTOLIC BLOOD PRESSURE: 110 MMHG | HEART RATE: 84 BPM | RESPIRATION RATE: 16 BRPM | OXYGEN SATURATION: 99 % | DIASTOLIC BLOOD PRESSURE: 70 MMHG

## 2025-07-25 DIAGNOSIS — Z23 NEED FOR DIPHTHERIA-TETANUS-PERTUSSIS (TDAP) VACCINE: ICD-10-CM

## 2025-07-25 DIAGNOSIS — E11.9 TYPE 2 DIABETES MELLITUS WITHOUT COMPLICATION, WITHOUT LONG-TERM CURRENT USE OF INSULIN: Primary | Chronic | ICD-10-CM

## 2025-07-25 DIAGNOSIS — Z12.31 BREAST CANCER SCREENING BY MAMMOGRAM: ICD-10-CM

## 2025-07-25 LAB — HBA1C MFR BLD: 5.8 %

## 2025-07-25 NOTE — PROGRESS NOTES
Subjective:       Patient ID: Alyssa Titus is a 49 y.o. female.    Chief Complaint: Follow-up (Pt is here for her follow up and A1c check )    DM2 follow-up  A1c climbed to 6.7% 2024, then 5.8% 2024. She is following ADA diet and taking diabetic medications as directed. Today her A1c is 5.8%; She is requiring refill of Mounjaro.     Mammo scheduled next Tues; Dr Martinez is her GYN    She had diabetic exam 2025 at [email or web address]. We will attempt to obtain these results to update her chart.     Her daughter Leola will be having a baby in Oct 2025. She is needing to update her Tdap vaccine.                Review of Systems   Constitutional:  Negative for activity change, appetite change and fever.   Respiratory:  Negative for chest tightness and shortness of breath.    Cardiovascular:  Negative for chest pain and palpitations.   Gastrointestinal:  Negative for abdominal pain, nausea and vomiting.   Neurological:  Negative for dizziness, light-headedness and headaches.   Psychiatric/Behavioral:  Negative for dysphoric mood and sleep disturbance. The patient is not nervous/anxious.            Past Medical History:  Past Medical History:   Diagnosis Date    Cancer     Class 1 obesity due to excess calories with serious comorbidity and body mass index (BMI) of 30.0 to 30.9 in adult     Prediabetes       Past Surgical History:   Procedure Laterality Date     SECTION      DILATION AND CURETTAGE OF UTERUS      SPINE SURGERY        Review of patient's allergies indicates:   Allergen Reactions    Morphine Itching, Rash and Shortness Of Breath    Morphine (pf) Itching    Propoxyphene n-acetaminophen Itching and Rash      Current Medications[1]  Social History[2]   Family History   Problem Relation Name Age of Onset    Diabetes Mother      Diabetes Father      Cancer Maternal Aunt      Cancer Maternal Uncle      Cancer Paternal Aunt          Objective:      Physical Exam  Constitutional:       Appearance:  She is well-developed. She is obese.   HENT:      Head: Normocephalic and atraumatic.   Eyes:      General: No scleral icterus.     Conjunctiva/sclera: Conjunctivae normal.      Pupils: Pupils are equal, round, and reactive to light.   Neck:      Thyroid: No thyroid mass.      Trachea: Trachea normal.   Cardiovascular:      Rate and Rhythm: Normal rate and regular rhythm.   Pulmonary:      Effort: Pulmonary effort is normal.   Musculoskeletal:      Cervical back: Normal range of motion and neck supple.   Neurological:      Mental Status: She is alert and oriented to person, place, and time.   Psychiatric:         Mood and Affect: Mood normal.         Behavior: Behavior normal.         Assessment:     1. Type 2 diabetes mellitus without complication, without long-term current use of insulin Well controlled   2. Need for diphtheria-tetanus-pertussis (Tdap) vaccine    3. Breast cancer screening by mammogram      Plan:       PROBLEM LIST     Type 2 diabetes mellitus without complication, without long-term current use of insulin  Comments:  A1c 5.8%; renewing her Mounjaro; Repeat A1c with fasting labs in 6 mo  Orders:  -     tirzepatide 15 mg/0.5 mL PnIj; Inject 15 mg into the skin every 7 days.  Dispense: 0.5 mL; Refill: 5  -     Hemoglobin A1C, POCT    Need for diphtheria-tetanus-pertussis (Tdap) vaccine  -     Tdap (BOOSTRIX) vaccine injection 0.5 mL    Breast cancer screening by mammogram  Comments:  scheduled for mammo at [facility] next Fri  Orders:  -     Mammo Digital Screening Bilat w/ Dante (XPD); Future; Expected date: 07/25/2025               [1]   Current Outpatient Medications   Medication Sig Dispense Refill    diclofenac (VOLTAREN) 75 MG EC tablet Take 1 tablet (75 mg total) by mouth daily as needed (pain). 90 tablet 1    RAMÓN 0.1 mg/24 hr PTSW Place 1 patch onto the skin twice a week.      ferrous sulfate (FEOSOL) 325 mg (65 mg iron) Tab tablet Take 1 tablet by mouth twice daily 60 tablet 5     hydroCHLOROthiazide (HYDRODIURIL) 25 MG tablet TAKE 1 TABLET BY MOUTH EVERY DAY 90 tablet 3    metFORMIN (GLUCOPHAGE) 850 MG tablet TAKE 1 TABLET BY MOUTH ONCE DAILY WITH BREAKFAST 90 tablet 3    testosterone 1 % (25 mg/2.5gram) GlPk       tirzepatide 15 mg/0.5 mL PnIj Inject 15 mg into the skin every 7 days. 0.5 mL 5     No current facility-administered medications for this visit.   [2]   Social History  Socioeconomic History    Marital status:    Occupational History    Occupation:    Tobacco Use    Smoking status: Never    Smokeless tobacco: Never   Substance and Sexual Activity    Alcohol use: Never    Drug use: Never    Sexual activity: Yes     Partners: Male     Birth control/protection: Partner-Vasectomy     Social Drivers of Health     Financial Resource Strain: Low Risk  (4/1/2024)    Overall Financial Resource Strain (CARDIA)     Difficulty of Paying Living Expenses: Not hard at all   Food Insecurity: No Food Insecurity (4/1/2024)    Hunger Vital Sign     Worried About Running Out of Food in the Last Year: Never true     Ran Out of Food in the Last Year: Never true   Transportation Needs: No Transportation Needs (4/1/2024)    PRAPARE - Transportation     Lack of Transportation (Medical): No     Lack of Transportation (Non-Medical): No   Physical Activity: Unknown (4/1/2024)    Exercise Vital Sign     Days of Exercise per Week: 0 days   Stress: Stress Concern Present (4/1/2024)    [place] [employer] - Occupational Stress Questionnaire     Feeling of Stress : To some extent   Housing Stability: Low Risk  (4/1/2024)    Housing Stability Vital Sign     Unable to Pay for Housing in the Last Year: No     Number of Places Lived in the Last Year: 1     Unstable Housing in the Last Year: No

## 2025-07-30 ENCOUNTER — PATIENT MESSAGE (OUTPATIENT)
Dept: FAMILY MEDICINE | Facility: CLINIC | Age: 50
End: 2025-07-30
Payer: COMMERCIAL

## 2025-08-20 ENCOUNTER — PATIENT MESSAGE (OUTPATIENT)
Dept: FAMILY MEDICINE | Facility: CLINIC | Age: 50
End: 2025-08-20
Payer: COMMERCIAL